# Patient Record
Sex: FEMALE | Race: BLACK OR AFRICAN AMERICAN | NOT HISPANIC OR LATINO | Employment: OTHER | URBAN - METROPOLITAN AREA
[De-identification: names, ages, dates, MRNs, and addresses within clinical notes are randomized per-mention and may not be internally consistent; named-entity substitution may affect disease eponyms.]

---

## 2017-02-03 ENCOUNTER — ALLSCRIPTS OFFICE VISIT (OUTPATIENT)
Dept: OTHER | Facility: OTHER | Age: 57
End: 2017-02-03

## 2018-01-13 VITALS
HEIGHT: 65 IN | HEART RATE: 58 BPM | WEIGHT: 262 LBS | SYSTOLIC BLOOD PRESSURE: 111 MMHG | DIASTOLIC BLOOD PRESSURE: 62 MMHG | BODY MASS INDEX: 43.65 KG/M2

## 2018-04-03 ENCOUNTER — HOSPITAL ENCOUNTER (EMERGENCY)
Facility: HOSPITAL | Age: 58
Discharge: HOME | End: 2018-04-04
Attending: EMERGENCY MEDICINE | Admitting: EMERGENCY MEDICINE
Payer: COMMERCIAL

## 2018-04-03 VITALS
RESPIRATION RATE: 18 BRPM | HEART RATE: 65 BPM | DIASTOLIC BLOOD PRESSURE: 85 MMHG | TEMPERATURE: 97.5 F | HEIGHT: 65 IN | WEIGHT: 270 LBS | BODY MASS INDEX: 44.98 KG/M2 | OXYGEN SATURATION: 100 % | SYSTOLIC BLOOD PRESSURE: 161 MMHG

## 2018-04-03 DIAGNOSIS — R25.2 MUSCLE CRAMP: ICD-10-CM

## 2018-04-03 DIAGNOSIS — T50.2X5A DIURETIC-INDUCED HYPOKALEMIA: ICD-10-CM

## 2018-04-03 DIAGNOSIS — E87.6 DIURETIC-INDUCED HYPOKALEMIA: ICD-10-CM

## 2018-04-03 DIAGNOSIS — E87.6 HYPOKALEMIA: Primary | ICD-10-CM

## 2018-04-04 LAB
ANION GAP SERPL CALCULATED.3IONS-SCNC: 12 MMOL/L (ref 4–13)
BUN SERPL-MCNC: 24 MG/DL (ref 5–25)
CALCIUM SERPL-MCNC: 9.5 MG/DL (ref 8.3–10.1)
CHLORIDE SERPL-SCNC: 100 MMOL/L (ref 100–108)
CK SERPL-CCNC: 58 U/L (ref 26–192)
CO2 SERPL-SCNC: 25 MMOL/L (ref 21–32)
CREAT SERPL-MCNC: 0.91 MG/DL (ref 0.6–1.3)
GFR SERPL CREATININE-BSD FRML MDRD: 82 ML/MIN/1.73SQ M
GLUCOSE SERPL-MCNC: 137 MG/DL (ref 65–140)
MAGNESIUM SERPL-MCNC: 2 MG/DL (ref 1.6–2.6)
POTASSIUM SERPL-SCNC: 3.2 MMOL/L (ref 3.5–5.3)
SODIUM SERPL-SCNC: 137 MMOL/L (ref 136–145)

## 2018-04-04 PROCEDURE — 99283 EMERGENCY DEPT VISIT LOW MDM: CPT

## 2018-04-04 PROCEDURE — 83735 ASSAY OF MAGNESIUM: CPT | Performed by: EMERGENCY MEDICINE

## 2018-04-04 PROCEDURE — 36415 COLL VENOUS BLD VENIPUNCTURE: CPT | Performed by: EMERGENCY MEDICINE

## 2018-04-04 PROCEDURE — 82550 ASSAY OF CK (CPK): CPT | Performed by: EMERGENCY MEDICINE

## 2018-04-04 PROCEDURE — 80048 BASIC METABOLIC PNL TOTAL CA: CPT | Performed by: EMERGENCY MEDICINE

## 2018-04-04 RX ORDER — POTASSIUM CHLORIDE 20 MEQ/1
20 TABLET, EXTENDED RELEASE ORAL 2 TIMES DAILY
Qty: 60 TABLET | Refills: 0 | Status: SHIPPED | OUTPATIENT
Start: 2018-04-04 | End: 2018-12-03 | Stop reason: SDDI

## 2018-04-04 RX ORDER — POTASSIUM CHLORIDE 20 MEQ/1
40 TABLET, EXTENDED RELEASE ORAL ONCE
Status: COMPLETED | OUTPATIENT
Start: 2018-04-04 | End: 2018-04-04

## 2018-04-04 RX ADMIN — POTASSIUM CHLORIDE 40 MEQ: 1500 TABLET, EXTENDED RELEASE ORAL at 01:27

## 2018-04-04 NOTE — ED PROVIDER NOTES
History  Chief Complaint   Patient presents with    Leg Pain     Patient reports recurrent leg pain over the past few weeks  Patient reports feeling like she has a stan horse and the inability to move her leg  Patient reports pain with any weight on it  Patient reports having abdominal pain and diarrhea as well     HPI    None       Past Medical History:   Diagnosis Date    Asthma     Hypertension        Past Surgical History:   Procedure Laterality Date     SECTION      HERNIA REPAIR         History reviewed  No pertinent family history  I have reviewed and agree with the history as documented  Social History   Substance Use Topics    Smoking status: Never Smoker    Smokeless tobacco: Never Used    Alcohol use Yes        Review of Systems    Physical Exam  ED Triage Vitals   Temperature Pulse Respirations Blood Pressure SpO2   18 2332 18   97 5 °F (36 4 °C) 65 18 161/85 100 %      Temp Source Heart Rate Source Patient Position - Orthostatic VS BP Location FiO2 (%)   18 23318 --   Oral Monitor Sitting Left arm       Pain Score       18       No Pain           Orthostatic Vital Signs  Vitals:    18   BP: 161/85   Pulse: 65   Patient Position - Orthostatic VS: Sitting       Physical Exam   Constitutional: She is oriented to person, place, and time  She appears well-developed and well-nourished  No distress  Morbid obesity   HENT:   Head: Normocephalic and atraumatic  Mouth/Throat: Oropharynx is clear and moist    Eyes: Conjunctivae are normal  Pupils are equal, round, and reactive to light  Neck: Normal range of motion  No tracheal deviation present  Cardiovascular: Normal rate, regular rhythm, normal heart sounds and intact distal pulses  Pulses:       Dorsalis pedis pulses are 2+ on the right side, and 2+ on the left side     Pulmonary/Chest: Effort normal and breath sounds normal  No respiratory distress  Abdominal: Soft  Bowel sounds are normal  She exhibits no distension  There is no tenderness  A hernia is present  Hernia confirmed positive in the ventral area (supraumbilical, soft, mild tenderness)  Musculoskeletal:        Right hip: She exhibits normal range of motion and normal strength  Right knee: She exhibits normal range of motion and no swelling  No tenderness found  Right ankle: She exhibits normal range of motion, no swelling, no ecchymosis and no deformity  Tenderness  Lateral malleolus and medial malleolus tenderness found  Lumbar back: She exhibits no tenderness  Right upper leg: She exhibits tenderness  She exhibits no bony tenderness, no swelling, no edema and no deformity  Right lower leg: She exhibits tenderness  She exhibits no bony tenderness, no swelling, no edema and no deformity  Left lower leg: She exhibits tenderness  Legs:  Neurological: She is alert and oriented to person, place, and time  She has normal strength  GCS eye subscore is 4  GCS verbal subscore is 5  GCS motor subscore is 6  Normal strength and sensation in bilateral legs   Skin: Skin is warm and dry  Psychiatric: She has a normal mood and affect  Her behavior is normal    Nursing note and vitals reviewed        ED Medications  Medications   potassium chloride (K-DUR,KLOR-CON) CR tablet 40 mEq (not administered)       Diagnostic Studies  Results Reviewed     Procedure Component Value Units Date/Time    Basic metabolic panel [[id number]]  (Abnormal) Collected:  04/04/18 0040    Lab Status:  Final result Specimen:  Blood from Hand, Right Updated:  04/04/18 0105     Sodium 137 mmol/L      Potassium 3 2 (L) mmol/L      Chloride 100 mmol/L      CO2 25 mmol/L      Anion Gap 12 mmol/L      BUN 24 mg/dL      Creatinine 0 91 mg/dL      Glucose 137 mg/dL      Calcium 9 5 mg/dL      eGFR 82 ml/min/1 73sq m     Narrative: National Kidney Disease Education Program recommendations are as follows:  GFR calculation is accurate only with a steady state creatinine  Chronic Kidney disease less than 60 ml/min/1 73 sq  meters  Kidney failure less than 15 ml/min/1 73 sq  meters  Magnesium [[id number]]  (Normal) Collected:  04/04/18 0040    Lab Status:  Final result Specimen:  Blood from Hand, Right Updated:  04/04/18 0105     Magnesium 2 0 mg/dL     CK Total with Reflex CKMB [[id number]]  (Normal) Collected:  04/04/18 0040    Lab Status:  Final result Specimen:  Blood from Hand, Right Updated:  04/04/18 0105     Total CK 58 U/L                  No orders to display              Procedures  Procedures       Phone Contacts  ED Phone Contact    ED Course  ED Course                                MDM  Number of Diagnoses or Management Options  Diuretic-induced hypokalemia: new and requires workup  Hypokalemia: new and requires workup  Muscle cramp: new and requires workup  Diagnosis management comments: This is a 65 year female who presents here today with right leg pain  She describes it as a severe cramping pain starting in her hip and radiating down her leg  It makes it feel like she cannot move her leg or put weight on it  She has associated burning and numbness type pain throughout her leg  She had an episode a couple weeks ago that lasted for about an hour and half  She states today she had an episode that lasted for about 3 hours prior to resolving  She has had brief, "charley horse" type pains in the leg throughout this time that usually only last for couple of seconds to a minute or so  She was seen a couple of weeks ago and diagnosed with a right ankle fracture from a fall six weeks prior to that  She is supposed to follow up with an orthopedist either tomorrow, or with the orthopedist that she has seen before on 4/7    She has been wearing a "soft splint" to her ankle, and was supposed to be wearing a walking boot, however because she has a history of Baker's cyst and problems with her knees for which she was told she needs a knee replacement, she feels that this is to heavy, so has not been wearing this when she has been walking around  She denies any recent falls or injuries  She occasionally has mild cramping pain in her left leg  She has no other cramping sensations, and says that even in her left leg the severity of the cramps are not as bad, and are not as associated with the burning numbness sensation, inability to move the leg  She denies any similar problems prior to the past couple of weeks  She has no leg swelling, recent travel  She was given Aleve today by somebody at work and did have gradual improvement of the pain over time  She denies any current pain  She does also mention some upper abdominal pain which she attributes to a known hernia that she was told that she needs surgery on  She states she has not yet had this surgery, and she has had surgery previously for this, and has been putting it off  She says over the past couple of months she feels like it is gradually becoming more painful and may be getting slightly larger, but denies any acute worsening or changing of the hernia  She has not followed up with her surgeon since she was told about this several months ago  She does endorse chronic mild diarrhea which has not recently changed from baseline  She denies any inability to move her bowels, nausea, vomiting, or any other complaints  ROS: Otherwise negative, unless stated as above  She is well-appearing, in no acute distress  She is morbidly obese  She does have mild tenderness to the medial and lateral thigh, as well as to her anterior shins bilaterally, and medial and lateral ankle  There are no areas of significant tenderness  She has no weakness, and is neurovascularly intact    Differential includes electrolyte abnormality from her HCTZ use, muscle pain and spasm secondary to abnormal gait from her recent ankle fracture, less likely rhabdomyolysis  The burning and numbness pain is likely due to peripheral nerve irritation from the muscle spasm  She is not having any pain in her back with these episodes, and states it starts near her hip in her thigh, consistent with peripheral nerve as opposed to central process  She does not have any symptoms to suggest DVT  She denies any known prior problems with her electrolytes, and says she recently had blood work checked by her doctor  She did have blood work checked by her PCP on 3/2 which showed normal potassium and creatinine, but magnesium was not checked at that time  She had an x-ray of her right ankle done at [facility] on 03/24 which showed "there is a horizontal line in the the right medial malleolus, which may represent undisplaced fracture "  We will check BMP and magnesium here to evaluate her electrolytes as well as CK  Do not feel that she needs any further workup for this at this time  She has no symptoms or findings on exam suggestive of acute incarcerated hernia for which she needs any emergent workup  She was advised to follow up with her surgeon for further evaluation of this, as well as findings suggestive of incarceration or strangulation for which she needs to be evaluated immediately in an emergency department  Her potassium is mildly low, which could be contributing to her symptoms  I discussed with the patient findings, treatment home for this, follow up with her primary care doctor, and indications for return, and she expresses understanding with this plan           Amount and/or Complexity of Data Reviewed  Clinical lab tests: ordered and reviewed  Decide to obtain previous medical records or to obtain history from someone other than the patient: yes  Review and summarize past medical records: yes      CritCare Time    Disposition  Final diagnoses:   Hypokalemia   Diuretic-induced hypokalemia   Muscle cramp     Time reflects when diagnosis was documented in both MDM as applicable and the Disposition within this note     Time User Action Codes Description Comment    4/4/2018  1:18 AM Senia Jo Add [E87 6] Hypokalemia     4/4/2018  1:19 AM Senia Jo Add [E87 6,  T50 2X5A] Diuretic-induced hypokalemia     4/4/2018  1:19 AM Senia Jo Add [R25 2] Muscle cramp       ED Disposition     ED Disposition Condition Comment    Discharge  Felix Benewah discharge to home/self care  Condition at discharge: Good        Follow-up Information     Follow up With Specialties Details Why [address], [address] Nurse Practitioner Go on 4/14/2018 as scheduled, for further evaluation of your symptoms, and repeat potassium [address] [address]  [phone]      your orthopedist  Go to as scheduled         Patient's Medications   Discharge Prescriptions    POTASSIUM CHLORIDE (K-DUR,KLOR-CON) 20 MEQ TABLET    Take 1 tablet (20 mEq total) by mouth 2 (two) times a day       Start Date: 4/4/2018  End Date: --       Order Dose: 20 mEq       Quantity: 60 tablet    Refills: 0     No discharge procedures on file      ED Provider  Electronically Signed by           Kimberly Briones MD  04/04/18 0199

## 2018-04-04 NOTE — DISCHARGE INSTRUCTIONS
Take the potassium as prescribed  Follow up with your doctor for recheck of your potassium level, as well as further evaluation of your symptoms  Follow up with the orthopedist as scheduled for further evaluation of your ankle injury  Return for worsening or changing symptoms, or for any other concerns  Hypokalemia   WHAT YOU NEED TO KNOW:   Hypokalemia is a low level of potassium in your blood  Potassium helps control how your muscles, heart, and digestive system work  Hypokalemia occurs when your body loses too much potassium or does not absorb enough from food  DISCHARGE INSTRUCTIONS:   Return to the emergency department if:   · You cannot move your arm or leg  · You have a fast or irregular heartbeat  · You are too tired or weak to stand up  Contact your healthcare provider if:   · You are vomiting, or you have diarrhea  · You have numbness or tingling in your arms or legs  · Your symptoms do not go away or they get worse  · You have questions or concerns about your condition or care  Medicines:   · Potassium  will be given to bring your potassium levels back to normal     · Take your medicine as directed  Contact your healthcare provider if you think your medicine is not helping or if you have side effects  Tell him of her if you are allergic to any medicine  Keep a list of the medicines, vitamins, and herbs you take  Include the amounts, and when and why you take them  Bring the list or the pill bottles to follow-up visits  Carry your medicine list with you in case of an emergency  Eat foods that are high in potassium:  Foods that are high in potassium include bananas, oranges, tomatoes, potatoes, and avocado  Swain beans, turkey, salmon, lean beef, yogurt, and milk are also high in potassium  Ask your healthcare provider or dietitian for more information about foods that are high in potassium     Follow up with your healthcare provider as directed:  Write down your questions so you remember to ask them during your visits  © 2017 [employer] Information is for End User's use only and may not be sold, redistributed or otherwise used for commercial purposes  All illustrations and images included in CareNotes® are the copyrighted property of A D [employer] M [employer]  or [employer]  The above information is an  only  It is not intended as medical advice for individual conditions or treatments  Talk to your doctor, nurse or pharmacist before following any medical regimen to see if it is safe and effective for you

## 2018-04-23 DIAGNOSIS — R56.9 SEIZURE (HCC): Primary | ICD-10-CM

## 2018-04-23 RX ORDER — OXCARBAZEPINE 300 MG/1
TABLET, FILM COATED ORAL
Qty: 180 TABLET | Refills: 3 | Status: SHIPPED | OUTPATIENT
Start: 2018-04-23 | End: 2018-05-31

## 2018-04-27 ENCOUNTER — TELEPHONE (OUTPATIENT)
Dept: NEUROLOGY | Facility: CLINIC | Age: 58
End: 2018-04-27

## 2018-04-27 NOTE — TELEPHONE ENCOUNTER
Pt called to report increased TN pain over past 2 wk sand "I'm at the point where I can't take it " Pt questioning if oxcarb 900mg BID can be increased  Pt has appt 5/31  Please advise       Pt [phone] or work [phone]

## 2018-04-27 NOTE — TELEPHONE ENCOUNTER
Called patient, advised to increase Trileptal to 1200 twice a day  She denies any side effects and in the past 900 milligrams had helped her  She will call us back in the next 2-3 days if she sees no relief

## 2018-04-28 ENCOUNTER — HOSPITAL ENCOUNTER (EMERGENCY)
Facility: HOSPITAL | Age: 58
Discharge: HOME | End: 2018-04-28
Attending: EMERGENCY MEDICINE | Admitting: EMERGENCY MEDICINE
Payer: COMMERCIAL

## 2018-04-28 VITALS
SYSTOLIC BLOOD PRESSURE: 195 MMHG | BODY MASS INDEX: 46.11 KG/M2 | RESPIRATION RATE: 18 BRPM | HEART RATE: 79 BPM | DIASTOLIC BLOOD PRESSURE: 87 MMHG | WEIGHT: 277.12 LBS | OXYGEN SATURATION: 96 % | TEMPERATURE: 98.3 F

## 2018-04-28 DIAGNOSIS — G50.0 TRIGEMINAL NEURALGIA OF RIGHT SIDE OF FACE: Primary | ICD-10-CM

## 2018-04-28 PROCEDURE — 99282 EMERGENCY DEPT VISIT SF MDM: CPT

## 2018-04-28 RX ORDER — HYDROCODONE BITARTRATE AND ACETAMINOPHEN 5; 325 MG/1; MG/1
1 TABLET ORAL ONCE
Status: COMPLETED | OUTPATIENT
Start: 2018-04-28 | End: 2018-04-28

## 2018-04-28 RX ORDER — HYDROCODONE BITARTRATE AND ACETAMINOPHEN 5; 325 MG/1; MG/1
1 TABLET ORAL EVERY 6 HOURS PRN
Qty: 13 TABLET | Refills: 0 | Status: SHIPPED | OUTPATIENT
Start: 2018-04-28 | End: 2018-12-03 | Stop reason: SDDI

## 2018-04-28 RX ORDER — LOSARTAN POTASSIUM AND HYDROCHLOROTHIAZIDE 12.5; 1 MG/1; MG/1
1 TABLET ORAL DAILY
COMMUNITY
Start: 2018-01-03

## 2018-04-28 RX ORDER — ALBUTEROL SULFATE 90 UG/1
1 AEROSOL, METERED RESPIRATORY (INHALATION) DAILY PRN
COMMUNITY
Start: 2015-01-26

## 2018-04-28 RX ADMIN — HYDROCODONE BITARTRATE AND ACETAMINOPHEN 1 TABLET: 5; 325 TABLET ORAL at 18:46

## 2018-04-28 NOTE — DISCHARGE INSTRUCTIONS
Trigeminal Neuralgia   WHAT YOU NEED TO KNOW:   Trigeminal neuralgia (TN) is a problem with your trigeminal nerve that causes severe facial pain  You have a trigeminal nerve on each side of your face  The nerves allow you to feel pain, touch, and temperature changes in different areas of your face  DISCHARGE INSTRUCTIONS:   Medicines:  Do not stop taking your medicines without talking with your healthcare provider first  Virgene Back can have a bad reaction if you stop your TN medicines suddenly  You may need any of the following:  · Anticonvulsants: These control seizures, help prevent pain attacks, and decrease symptoms  · Antidepressants: These decrease pain and help prevent depression  · Muscle relaxers:  When your facial muscles are relaxed, you may be less likely to have pain attacks  · Pain medicines: You may be given pain medicines if your facial pain is severe  · Take your medicine as directed  Contact your healthcare provider if you think your medicine is not helping or if you have side effects  Tell him or her if you are allergic to any medicine  Keep a list of the medicines, vitamins, and herbs you take  Include the amounts, and when and why you take them  Bring the list or the pill bottles to follow-up visits  Carry your medicine list with you in case of an emergency  Follow up with your healthcare provider as directed: You may need to have blood tests to check your blood levels of certain medicines  Ask how often you need to return to have these blood tests  Write down your questions so you remember to ask them during your visits  Manage your trigeminal neuralgia:  Even if you have not had symptoms for a long time, keep your medicines nearby  If your symptoms return, contact your healthcare provider before you start to take your medicines again  Contact your healthcare provider if:   · The medicines you are taking are not decreasing your pain      · You feel worried and depressed and find it hard to do your daily activities  · You have headaches, mouth sores, an upset stomach, or diarrhea  · Your pain feels worse, different, or moves to another area of your face  · You have questions or concerns about your condition or care  Return to the emergency department if:   · You have a fever, stiff neck, or you develop a skin rash or peeling skin  · You have clear or yellow fluid leaking from your procedure or surgery site  · You are feeling so depressed you want to harm yourself  · You are confused and cannot think clearly  · You are not eating or drinking, and you are losing weight  · You have eye pain, eye numbness, or sudden vision or hearing changes  · You have sudden dizziness, problems with movement, weakness, or numbness in your face  © 2017 [employer]  Information is for End User's use only and may not be sold, redistributed or otherwise used for commercial purposes  All illustrations and images included in CareNotes® are the copyrighted property of [employer]  or Gregory Garcia  The above information is an  only  It is not intended as medical advice for individual conditions or treatments  Talk to your doctor, nurse or pharmacist before following any medical regimen to see if it is safe and effective for you

## 2018-04-28 NOTE — ED PROVIDER NOTES
History  Chief Complaint   Patient presents with    Dental Pain     Patient c/o upper right dental pain  45-year-old female with past medical history significant for trigeminal neuralgia presents today with worsening of her pain  Pain is on the right side of her face, intermittent, and like a shock  Called her neurologist yesterday who increased her Trileptal to 400 mg twice a day  Has taken 2 doses of the increased dose with no change in her pain  Has not taken anything else for the pain  There is no rash  She is not having difficulty swallowing  No ear pain, no tinnitus  History provided by:  Patient      Prior to Admission Medications   Prescriptions Last Dose Informant Patient Reported? Taking? OXcarbazepine (TRILEPTAL) 300 mg tablet   No Yes   Sig: TAKE THREE TABLETS BY MOUTH TWO TIMES A DAY    albuterol (PROVENTIL HFA,VENTOLIN HFA) 90 mcg/act inhaler   Yes Yes   Si puff(s)   losartan-hydrochlorothiazide (HYZAAR) 100-12 5 MG per tablet   Yes Yes   Sig: Take 1 tablet by mouth   potassium chloride (K-DUR,KLOR-CON) 20 mEq tablet   No Yes   Sig: Take 1 tablet (20 mEq total) by mouth 2 (two) times a day      Facility-Administered Medications: None       Past Medical History:   Diagnosis Date    Asthma     Hypertension        Past Surgical History:   Procedure Laterality Date     SECTION      HERNIA REPAIR         History reviewed  No pertinent family history  I have reviewed and agree with the history as documented  Social History   Substance Use Topics    Smoking status: Never Smoker    Smokeless tobacco: Never Used    Alcohol use No        Review of Systems   Constitutional: Negative for diaphoresis, fatigue and fever  HENT: Negative for congestion, drooling, ear pain, facial swelling, mouth sores, sinus pain, tinnitus and trouble swallowing  Eyes: Negative for pain, discharge and visual disturbance  Respiratory: Negative for shortness of breath      Skin: Negative for pallor, rash and wound  Neurological: Negative for dizziness, facial asymmetry, speech difficulty and light-headedness  Psychiatric/Behavioral: Negative for confusion  Physical Exam  ED Triage Vitals   Temperature Pulse Respirations Blood Pressure SpO2   04/28/18 1805 04/28/18 1805 04/28/18 1805 04/28/18 1805 04/28/18 1805   98 3 °F (36 8 °C) 79 18 (!) 195/87 96 %      Temp Source Heart Rate Source Patient Position - Orthostatic VS BP Location FiO2 (%)   04/28/18 1805 04/28/18 1805 -- 04/28/18 1805 --   Oral Monitor  Right arm       Pain Score       04/28/18 1846       Worst Possible Pain           Orthostatic Vital Signs  Vitals:    04/28/18 1805   BP: (!) 195/87   Pulse: 79       Physical Exam   Constitutional: She is oriented to person, place, and time  She appears well-developed and well-nourished  HENT:   Head: Normocephalic and atraumatic  Right Ear: External ear normal    Left Ear: External ear normal    Mouth/Throat: Oropharynx is clear and moist    Eyes: Conjunctivae and EOM are normal  Pupils are equal, round, and reactive to light  Neck: Normal range of motion  Neck supple  No tracheal deviation present  Lymphadenopathy:     She has no cervical adenopathy  Neurological: She is alert and oriented to person, place, and time  Patient moving all extremities equally, no focal neuro deficits noted  Skin: Skin is warm and dry  No rash noted  Psychiatric: She has a normal mood and affect  Nursing note and vitals reviewed        ED Medications  Medications   HYDROcodone-acetaminophen (NORCO) 5-325 mg per tablet 1 tablet (1 tablet Oral Given 4/28/18 1846)       Diagnostic Studies  Results Reviewed     None                 No orders to display              Procedures  Procedures       Phone Contacts  ED Phone Contact    ED Course                               MDM  Number of Diagnoses or Management Options  Trigeminal neuralgia of right side of face: new and does not require workup  Risk of Complications, Morbidity, and/or Mortality  Presenting problems: low  Management options: low    Patient Progress  Patient progress: stable    CritCare Time    Disposition  Final diagnoses:   Trigeminal neuralgia of right side of face     Time reflects when diagnosis was documented in both MDM as applicable and the Disposition within this note     Time User Action Codes Description Comment    4/28/2018  6:43 PM Radha Lainez Add [G50 0] Trigeminal neuralgia of right side of face       ED Disposition     ED Disposition Condition Comment    Discharge  Everlene Roland discharge to home/self care  Condition at discharge: Stable        Follow-up Information     Follow up With Specialties Details Why Lacey Hicks MD Neurology Schedule an appointment as soon as possible for a visit  [place] [address]  [phone]          Patient's Medications   Discharge Prescriptions    HYDROCODONE-ACETAMINOPHEN (NORCO) 5-325 MG PER TABLET    Take 1 tablet by mouth every 6 (six) hours as needed for pain for up to 13 doses Max Daily Amount: 4 tablets       Start Date: 4/28/2018 End Date: --       Order Dose: 1 tablet       Quantity: 13 tablet    Refills: 0     No discharge procedures on file      ED Provider  Electronically Signed by           Carol Abebe DO  04/28/18 5570

## 2018-04-30 DIAGNOSIS — G50.0 TRIGEMINAL NEURALGIA: Primary | ICD-10-CM

## 2018-04-30 RX ORDER — GABAPENTIN 100 MG/1
CAPSULE ORAL
Qty: 100 CAPSULE | Refills: 5 | Status: SHIPPED | OUTPATIENT
Start: 2018-04-30 | End: 2018-12-03 | Stop reason: SDDI

## 2018-04-30 NOTE — TELEPHONE ENCOUNTER
Spoke with patient, will initiate gabapentin starting with 100 mg 3 times daily and titrating up to 300 mg 3 times daily if necessary    If still not effective she will notify me

## 2018-04-30 NOTE — TELEPHONE ENCOUNTER
Pt states that she went to the ER on Saturday for the pain and she was given hydrocodone acetaminophen 5-325 mg Q6H PRN  She states that this took away some of her pain, but not all of it, and she can not chew and has not been able to eat since Friday  Questioning to speak with you         [phone] or work at [phone]

## 2018-05-31 ENCOUNTER — OFFICE VISIT (OUTPATIENT)
Dept: NEUROLOGY | Facility: CLINIC | Age: 58
End: 2018-05-31
Payer: COMMERCIAL

## 2018-05-31 VITALS
HEIGHT: 65 IN | DIASTOLIC BLOOD PRESSURE: 76 MMHG | BODY MASS INDEX: 44.15 KG/M2 | WEIGHT: 265 LBS | HEART RATE: 66 BPM | SYSTOLIC BLOOD PRESSURE: 146 MMHG

## 2018-05-31 DIAGNOSIS — G50.0 TRIGEMINAL NEURALGIA: Primary | ICD-10-CM

## 2018-05-31 PROCEDURE — 99214 OFFICE O/P EST MOD 30 MIN: CPT | Performed by: PSYCHIATRY & NEUROLOGY

## 2018-05-31 RX ORDER — OXCARBAZEPINE 600 MG/1
TABLET, FILM COATED ORAL
Qty: 120 TABLET | Refills: 5 | Status: SHIPPED | OUTPATIENT
Start: 2018-05-31 | End: 2018-06-13

## 2018-05-31 RX ORDER — FERROUS SULFATE 325(65) MG
2 TABLET ORAL
COMMUNITY
End: 2018-12-03 | Stop reason: SDDI

## 2018-05-31 RX ORDER — DOCUSATE SODIUM 100 MG/1
1 CAPSULE, LIQUID FILLED ORAL DAILY
COMMUNITY
End: 2018-12-03 | Stop reason: SDDI

## 2018-05-31 RX ORDER — CLINDAMYCIN HYDROCHLORIDE 300 MG/1
CAPSULE ORAL
Refills: 0 | COMMUNITY
Start: 2018-05-03 | End: 2018-12-03 | Stop reason: SDDI

## 2018-05-31 RX ORDER — NAPROXEN 375 MG/1
TABLET, DELAYED RELEASE ORAL EVERY 12 HOURS
COMMUNITY
End: 2018-12-03 | Stop reason: SDDI

## 2018-05-31 NOTE — PROGRESS NOTES
Sheila Delvalle is a 65 y o  female  Chief Complaint   Patient presents with    Facial Pain     right side       Assessment:  1  Trigeminal neuralgia        Plan:  Increased Trileptal to 1200 mg twice daily  Follow-up 6 months    Discussion:  Mike Wagoner had increased breakthrough pain which has improved with 900 mg twice daily but is still having breakthrough pain before her next dose  I have recommended increasing to 1200 mg twice daily and if she continues to have problems would consider initiating gabapentin  Will plan to see her back in 6 months, sooner if she continues to have symptoms    Subjective:    HPI Meryle Johnson started to have breakthrough pain for trigeminal neuralgia about a month ago  She states that occurred around the time that she was started on potassium and was being treated for at a right facial infection  She states that she felt that the potassium in the antibiotics are interfering with her medication  When she completed the antibiotics she stopped the potassium but was still having some pain  Her dose of Trileptal was increased from 600 mg twice a day to 900 mg twice daily but was still having episodes of breakthrough pain  I had recommended that she initiate gabapentin but she did not want to add another medication and did feel that she was getting enough relief with her current dose of Trileptal   At present she would like to modify the dose  We discussed the potential to take 600 mg 3 times daily but she feels that she would not be able to remember to take the middle dose so we will increase her dose to 1200 mg twice daily    If she has problems with the medicine she will notify me    Vitals:    05/31/18 1330   BP: 146/76   BP Location: Left arm   Patient Position: Sitting   Cuff Size: Large   Pulse: 66   Weight: 120 kg (265 lb)   Height: 5' 4 5" (1 638 m)       Current Medications    Current Outpatient Prescriptions:     albuterol (PROVENTIL HFA,VENTOLIN HFA) 90 mcg/act inhaler, 2 puff(s), Disp: , Rfl:     gabapentin (NEURONTIN) 100 mg capsule, 1-3 pills 3 times daily as directed, Disp: 100 capsule, Rfl: 5    levonorgestrel (MIRENA, 52 MG,) 20 MCG/24HR IUD, by Intrauterine route, Disp: , Rfl:     losartan-hydrochlorothiazide (HYZAAR) 100-12 5 MG per tablet, Take 1 tablet by mouth, Disp: , Rfl:     Mometasone Furo-Formoterol Fum (DULERA) 200-5 MCG/ACT AERO, Inhale Daily, Disp: , Rfl:     OXcarbazepine (TRILEPTAL) 300 mg tablet, TAKE THREE TABLETS BY MOUTH TWO TIMES A DAY , Disp: 180 tablet, Rfl: 3    clindamycin (CLEOCIN) 300 MG capsule, TAKE 1 CAPSULE (300 MG) BY ORAL ROUTE EVERY 8 HOURS, Disp: , Rfl: 0    docusate sodium (COLACE) 100 mg capsule, Take 1 capsule by mouth daily, Disp: , Rfl:     ferrous sulfate 325 (65 Fe) mg tablet, Take 2 tablets by mouth, Disp: , Rfl:     HYDROcodone-acetaminophen (NORCO) 5-325 mg per tablet, Take 1 tablet by mouth every 6 (six) hours as needed for pain for up to 13 doses Max Daily Amount: 4 tablets, Disp: 13 tablet, Rfl: 0    naproxen (EC-NAPROSYN) 375 MG TBEC, Every 12 hours, Disp: , Rfl:     potassium chloride (K-DUR,KLOR-CON) 20 mEq tablet, Take 1 tablet (20 mEq total) by mouth 2 (two) times a day, Disp: 60 tablet, Rfl: 0      Allergies  Amoxicillin;  Iodine; and Shellfish-derived products    Past Medical History  Past Medical History:   Diagnosis Date    Anemia     Arthritis     Asthma     Baker's cyst     Chronic hip pain, left     CTS (carpal tunnel syndrome)     Facial pain     Hypertension     Knee pain     Shoulder injury     Trigeminal neuralgia          Past Surgical History:  Past Surgical History:   Procedure Laterality Date     SECTION      HERNIA REPAIR      TOOTH EXTRACTION      TUBAL LIGATION           Family History:  Family History   Problem Relation Age of Onset    Diabetes Mother     Lung cancer Mother     Diabetes Father     Throat cancer Father     Diabetes Sister     Heart disease Sister    Geetha Verma Alzheimer's disease Maternal Grandmother        Social History:   reports that she has never smoked  She has never used smokeless tobacco  She reports that she does not drink alcohol or use drugs  I have reviewed the past medical, social and family history, current medications, allergies, vitals, review of systems and updated this information as appropriate today     Objective:    Physical Exam    Neurological Exam  GENERAL:  Well-developed well-nourished woman in no acute distress  HEENT/NECK: Head is atraumatic normocephalic, neck is supple  CARDIOVASCULAR:  No Carotid bruit  NEUROLOGIC:  Mental Status: Awake and alert without aphasia  Cranial Nerves: Extraocular movements are full  Face is symmetrical to light touch in movements  Motor:  No drift is noted on arm extension  Coordination:  Finger-to-nose testing is performed accurately  Romberg is negative  Gait is stable                ROS:  Review of Systems   Constitutional: Negative  Negative for appetite change and fever  HENT: Negative  Negative for hearing loss, tinnitus, trouble swallowing and voice change  Eyes: Positive for pain  Negative for photophobia  Respiratory: Negative  Negative for shortness of breath  Cardiovascular: Negative  Negative for palpitations  Gastrointestinal: Negative  Negative for nausea and vomiting  Endocrine: Negative  Negative for cold intolerance and heat intolerance  Genitourinary: Negative  Negative for dysuria, frequency and urgency  Musculoskeletal: Positive for gait problem  Negative for myalgias and neck pain  Skin: Negative  Negative for rash  Neurological: Positive for headaches  Negative for dizziness, tremors, seizures, syncope, facial asymmetry, speech difficulty, weakness, light-headedness and numbness  Hematological: Negative  Does not bruise/bleed easily  Psychiatric/Behavioral: Positive for sleep disturbance  Negative for confusion and hallucinations

## 2018-06-01 ENCOUNTER — DOCUMENTATION (OUTPATIENT)
Dept: NEUROLOGY | Facility: CLINIC | Age: 58
End: 2018-06-01

## 2018-06-01 NOTE — PROGRESS NOTES
Pt called requesting a letter stating she had an office appt yesterday   Letter sent to H:[phone] per pt request

## 2018-06-13 ENCOUNTER — TELEPHONE (OUTPATIENT)
Dept: NEUROLOGY | Facility: CLINIC | Age: 58
End: 2018-06-13

## 2018-06-13 DIAGNOSIS — G50.0 TRIGEMINAL NEURALGIA: Primary | ICD-10-CM

## 2018-06-13 RX ORDER — OXCARBAZEPINE 300 MG/1
TABLET, FILM COATED ORAL
Qty: 210 TABLET | Refills: 5 | Status: SHIPPED | OUTPATIENT
Start: 2018-06-13 | End: 2018-12-03 | Stop reason: SDUPTHER

## 2018-06-13 NOTE — TELEPHONE ENCOUNTER
pt called and states that increased dose of oxcarbazepine was not working  so she decreased back to 900mg bid  she states that she couldn't eat or talk when taking  higher dose  she states that she will take 1 additional 300mg tab if pain and really bad, she would take this dose in between doses if needed    she wants to make sure this is ok to do     [phone]

## 2018-06-13 NOTE — TELEPHONE ENCOUNTER
Mera reports that the 600 mg tablet did not help her pain at all  She is back on the 300 mg pills using 900 twice daily and sometimes taking an additional 300 mg mid day  If she has issues in the future to consider using branded medicine    A new prescription for the 300 mg pills was sent to the pharmacy

## 2018-12-03 ENCOUNTER — OFFICE VISIT (OUTPATIENT)
Dept: NEUROLOGY | Facility: CLINIC | Age: 58
End: 2018-12-03
Payer: COMMERCIAL

## 2018-12-03 VITALS
HEIGHT: 65 IN | WEIGHT: 272 LBS | BODY MASS INDEX: 45.32 KG/M2 | SYSTOLIC BLOOD PRESSURE: 144 MMHG | HEART RATE: 70 BPM | DIASTOLIC BLOOD PRESSURE: 78 MMHG

## 2018-12-03 DIAGNOSIS — G50.0 TRIGEMINAL NEURALGIA: Primary | ICD-10-CM

## 2018-12-03 PROCEDURE — 99213 OFFICE O/P EST LOW 20 MIN: CPT | Performed by: PSYCHIATRY & NEUROLOGY

## 2018-12-03 RX ORDER — METHOCARBAMOL 500 MG/1
500 TABLET, FILM COATED ORAL EVERY 6 HOURS PRN
COMMUNITY
End: 2019-05-22 | Stop reason: ALTCHOICE

## 2018-12-03 RX ORDER — OXYCODONE HYDROCHLORIDE 5 MG/1
5 TABLET ORAL EVERY 4 HOURS PRN
COMMUNITY
End: 2019-03-13 | Stop reason: ALTCHOICE

## 2018-12-03 RX ORDER — OXCARBAZEPINE 300 MG/1
300 TABLET, FILM COATED ORAL EVERY 12 HOURS SCHEDULED
Qty: 210 TABLET | Refills: 5 | Status: SHIPPED | OUTPATIENT
Start: 2018-12-03 | End: 2019-04-09 | Stop reason: SDUPTHER

## 2018-12-03 NOTE — PROGRESS NOTES
Tyree Vega is a 63 y o  female returns in follow-up today with history of trigeminal neuralgia    Assessment:  1  Trigeminal neuralgia        Plan:  Continue current dose of Trileptal  Follow-up 6 months    Discussion:  Solitario List is doing well with current management  She will continue with her same dose of Trileptal in follow-up in 6 months      Subjective:    HUNTER Tesfaye reports that her facial pain symptoms have been under good control with current dose of Trileptal   She has no adverse effects from the medication  She recently had hernia surgery and her blood work at that time reportedly demonstrated no problems with her sodium level  She anticipates having the surgery in the near future      Past Medical History:   Diagnosis Date    Anemia     Arthritis     Asthma     Baker's cyst     Chronic hip pain, left     CTS (carpal tunnel syndrome)     Facial pain     Hypertension     Knee pain     Shoulder injury     Trigeminal neuralgia        Family History:  Family History   Problem Relation Age of Onset    Diabetes Mother     Lung cancer Mother     Diabetes Father     Throat cancer Father     Diabetes Sister     Heart disease Sister     Alzheimer's disease Maternal Grandmother        Past Surgical History:  Past Surgical History:   Procedure Laterality Date     SECTION      HERNIA REPAIR      TOOTH EXTRACTION      TUBAL LIGATION         Social History:   reports that she has quit smoking  She has never used smokeless tobacco  She reports that she does not drink alcohol or use drugs  Allergies:  Cats claw [uncaria tomentosa (cats claw)]; Eggs or egg-derived products; Iodine; Other;  Shellfish-derived products; and Amoxicillin      Current Outpatient Prescriptions:     albuterol (PROVENTIL HFA,VENTOLIN HFA) 90 mcg/act inhaler, 2 puff(s) prn, Disp: , Rfl:     levonorgestrel (MIRENA, 52 MG,) 20 MCG/24HR IUD, by Intrauterine route, Disp: , Rfl:    losartan-hydrochlorothiazide (HYZAAR) 100-12 5 MG per tablet, Take 1 tablet by mouth, Disp: , Rfl:     methocarbamol (ROBAXIN) 500 mg tablet, Take 500 mg by mouth every 6 (six) hours as needed for muscle spasms, Disp: , Rfl:     OXcarbazepine (TRILEPTAL) 300 mg tablet, Three p o  q a m  and 4 p o  q p m  (Patient taking differently: Take 300 mg by mouth Three p o  q a m  and 4 p o  q p m  ), Disp: 210 tablet, Rfl: 5    oxyCODONE (ROXICODONE) 5 mg immediate release tablet, Take 5 mg by mouth every 4 (four) hours as needed for moderate pain, Disp: , Rfl:     I have reviewed the past medical, social and family history, current medications, allergies, vitals, review of systems and updated this information as appropriate today     Objective:    Vitals:  Blood pressure 144/78, pulse 70, height 5' 4 5" (1 638 m), weight 123 kg (272 lb)  Physical Exam    Neurological Exam  GENERAL:  Well-developed well-nourished woman in no acute distress  HEENT/NECK: Head is atraumatic normocephalic, neck is supple  CARDIOVASCULAR:   No Carotid bruit  NEUROLOGIC:  Mental Status: Awake and alert without aphasia  Cranial Nerves: Extraocular movements are full  Face is symmetrical  Motor:   No drift is noted on arm extension  Coordination:   Gait reveals an antalgic gait pattern favoring the right leg utilizing a straight cane            ROS:    Review of Systems   Constitutional: Positive for fever  Negative for appetite change  HENT: Negative  Negative for hearing loss, tinnitus, trouble swallowing and voice change  Eyes: Negative  Negative for photophobia and pain  Respiratory: Negative  Negative for shortness of breath  Cardiovascular: Negative  Negative for palpitations  Gastrointestinal: Positive for nausea  Negative for vomiting  Endocrine: Negative  Negative for cold intolerance and heat intolerance  Genitourinary: Negative  Negative for dysuria, frequency and urgency     Musculoskeletal: Positive for gait problem (at times)  Negative for myalgias and neck pain  Skin: Negative  Negative for rash  Neurological: Negative for dizziness, tremors, seizures, syncope, facial asymmetry, speech difficulty, weakness, light-headedness, numbness and headaches  Hematological: Negative  Does not bruise/bleed easily  Psychiatric/Behavioral: Positive for sleep disturbance  Negative for confusion and hallucinations  All other systems reviewed and are negative

## 2019-02-21 ENCOUNTER — TELEPHONE (OUTPATIENT)
Dept: NEUROLOGY | Facility: CLINIC | Age: 59
End: 2019-02-21

## 2019-02-21 NOTE — TELEPHONE ENCOUNTER
Patient states not a workman's comp issue, she fell due to the numbness in her feet   Do you want me to put her in for a 40 min or an hour slot?

## 2019-02-21 NOTE — TELEPHONE ENCOUNTER
Called patient to clarify reason for earlier appointment  Patient states she had a fall at work and is now having sharp pains in her head as well as Numbness/Tingling in her feet  She is not sure if it has anything to do with her medications  Scheduling put her in a 20 min spot for March 13th  Please let me know if you need a longer visit

## 2019-03-13 ENCOUNTER — OFFICE VISIT (OUTPATIENT)
Dept: NEUROLOGY | Facility: CLINIC | Age: 59
End: 2019-03-13
Payer: COMMERCIAL

## 2019-03-13 VITALS
BODY MASS INDEX: 46.75 KG/M2 | HEART RATE: 76 BPM | SYSTOLIC BLOOD PRESSURE: 110 MMHG | DIASTOLIC BLOOD PRESSURE: 80 MMHG | WEIGHT: 280.6 LBS | HEIGHT: 65 IN

## 2019-03-13 DIAGNOSIS — G44.309 POST-TRAUMATIC HEADACHE, NOT INTRACTABLE, UNSPECIFIED CHRONICITY PATTERN: ICD-10-CM

## 2019-03-13 DIAGNOSIS — R20.2 PARESTHESIA: ICD-10-CM

## 2019-03-13 DIAGNOSIS — S06.0X0A CONCUSSION WITHOUT LOSS OF CONSCIOUSNESS, INITIAL ENCOUNTER: ICD-10-CM

## 2019-03-13 DIAGNOSIS — G50.0 TRIGEMINAL NEURALGIA: ICD-10-CM

## 2019-03-13 DIAGNOSIS — R41.3 MEMORY DIFFICULTY: Primary | ICD-10-CM

## 2019-03-13 PROCEDURE — 99215 OFFICE O/P EST HI 40 MIN: CPT | Performed by: PSYCHIATRY & NEUROLOGY

## 2019-03-13 RX ORDER — IBUPROFEN 800 MG/1
1 TABLET ORAL DAILY PRN
COMMUNITY
End: 2019-08-09 | Stop reason: ALTCHOICE

## 2019-03-13 RX ORDER — GABAPENTIN 100 MG/1
1 CAPSULE ORAL DAILY
COMMUNITY
End: 2019-05-22 | Stop reason: SDDI

## 2019-03-13 NOTE — PROGRESS NOTES
Alex Garcia is a 63 y o  female with history of trigeminal neuralgia who now presents with headaches, memory difficulty and paresthesia    Assessment:  1  Memory difficulty    2  Post-traumatic headache, not intractable, unspecified chronicity pattern    3  Paresthesia    4  Concussion without loss of consciousness, initial encounter    5  Trigeminal neuralgia        Plan:  Blood work  EMG right upper lower extremity  Speech therapy for cognitive therapy  Continue oxcarbazepine and gabapentin  Follow-up 2 months    Discussion:  Mera trigeminal neuralgia symptoms are under good control with present management  She unfortunately had a head injury with a subsequent concussion in November and since that time has had headaches and memory difficulty, which I suspect her secondary to her concussion  She has noted improvement in her headaches over time  Have recommended speech therapy for cognitive therapy and she will continue with ibuprofen as needed for her headache  She does have findings consistent with peripheral neuropathy and have recommended blood work and EMG to rule out secondary etiologies  I will see her back in follow-up on these are completed      Subjective:    HUNTER  Brice Apgar presents today with new problems  She states her trigeminal neuralgia symptoms are under good control with current management  She states that before she retired from work in November she was stepping into an elevator which was not even with the floor  She states she tripped she got in and fell forward hitting her head on the bar in on the floor  She did not lose consciousness but saw stars  She had headaches nausea and vomiting afterwards  She was taken to a hospital in [place] where she states she had a CT scan done and she was told this was fine  She states that since that time she has had headaches    She states the headaches localized prone to the vertex of her head which was where she hit her head and also in the frontal region  It is a pressure throbbing type pain associated with sonophobia  She states that right after the head injury she had frequent severe headaches associated with nausea and vomiting  She states gradually these have improved to a point where she gets a headache about once a week and finds that ibuprofen is effective in stopping it  She denies anything that precipitates the headache  She states if she does not take ibuprofen the headache can last for a day or 2  She denies any history of headaches of any significance prior to her head injury  She states in addition to the headaches she has been having problems with her memory  She states her  complaints she can't remember anything  He will tell her something and she does not remember  She states she had an appointment to come here but forgot about it until someone called to remind her  She denied any problems with her memory prior to her head injury and has not noticed any significant improvement over time  Independent of the symptoms she notes numbness in her toes  She denies any symptoms of neuropathic pain other than occasional stabbing type pain but this or occurs infrequently  She has not noticed any weakness in her legs  She notes that her balance isn't as good as it should be        Past Medical History:   Diagnosis Date    Anemia     Arthritis     Asthma     Baker's cyst     Chronic hip pain, left     CTS (carpal tunnel syndrome)     Facial pain     Hypertension     Knee pain     Shoulder injury     Trigeminal neuralgia        Family History:  Family History   Problem Relation Age of Onset    Diabetes Mother     Lung cancer Mother     Diabetes Father     Throat cancer Father     Diabetes Sister     Heart disease Sister     Alzheimer's disease Maternal Grandmother        Past Surgical History:  Past Surgical History:   Procedure Laterality Date     SECTION      HERNIA REPAIR      TOOTH EXTRACTION      TUBAL LIGATION         Social History:   reports that she has quit smoking  She has never used smokeless tobacco  She reports that she does not drink alcohol or use drugs  Allergies:  Cats claw [uncaria tomentosa (cats claw)]; Eggs or egg-derived products; Iodine; Other; Shellfish-derived products; and Amoxicillin      Current Outpatient Medications:     albuterol (PROVENTIL HFA,VENTOLIN HFA) 90 mcg/act inhaler, 2 puff(s) prn, Disp: , Rfl:     gabapentin (NEURONTIN) 100 mg capsule, Take 1 caplet by mouth daily Take 3 cap in morning 4 cap at night, Disp: , Rfl:     ibuprofen (MOTRIN) 800 mg tablet, Take 1 tablet by mouth daily as needed, Disp: , Rfl:     levonorgestrel (MIRENA, 52 MG,) 20 MCG/24HR IUD, by Intrauterine route, Disp: , Rfl:     losartan-hydrochlorothiazide (HYZAAR) 100-12 5 MG per tablet, Take 1 tablet by mouth daily , Disp: , Rfl:     methocarbamol (ROBAXIN) 500 mg tablet, Take 500 mg by mouth every 6 (six) hours as needed for muscle spasms, Disp: , Rfl:     OXcarbazepine (TRILEPTAL) 300 mg tablet, Take 1 tablet (300 mg total) by mouth every 12 (twelve) hours Three p o  q a m  and 4 p o  q p m , Disp: 210 tablet, Rfl: 5    I have reviewed the past medical, social and family history, current medications, allergies, vitals, review of systems and updated this information as appropriate today     Objective:    Vitals:  Blood pressure 110/80, pulse 76, height 5' 4 5" (1 638 m), weight 127 kg (280 lb 9 6 oz)  Physical Exam    Neurological Exam    GENERAL:  Cooperative in no acute distress  Well-developed and well-nourished    HEAD and NECK   Head is atraumatic normocephalic with no lesions or masses  Neck is supple with full range of motion    CARDIOVASCULAR  Carotid Arteries-no carotid bruits  NEUROLOGIC:  Mental Status-the patient is awake alert and oriented without aphasia or apraxia  She scored 29/30 on Wagoner, failure to recall 1 of 3 objects after a few minutes    She was able to score 4/4 on clock drawing  Cranial Nerves: Visual fields are full to confrontation  Discs are flat  Extraocular movements are full without nystagmus  Pupils are 2-1/2 mm and reactive  Face is symmetrical to light touch  Movements of facial expression move symmetrically  Hearing is normal to finger rub bilaterally  Soft palate lifts symmetrically  Shoulder shrug is symmetrical  Tongue is midline without atrophy  Motor: No drift is noted on arm extension  Strength is full in the upper and lower extremities with normal bulk and tone  Sensory:  Diminished temperature and vibratory sensation in the distal lower extremities bilaterally  Cortical function is intact  Coordination: Finger to nose testing is performed accurately  Romberg is negative  Gait reveals a normal base with symmetrical arm swing  Tandem walk is normal   Reflexes:  2/4 in the biceps, triceps, brachioradialis, knee jerk and ankle jerk regions  Toes are downgoing            ROS:    Review of Systems   Constitutional: Positive for fatigue  Eyes: Negative  Respiratory: Negative  Cardiovascular: Negative  Gastrointestinal: Positive for nausea  Endocrine: Negative  Musculoskeletal: Positive for arthralgias (b/l legs), back pain, gait problem and neck pain  Skin: Negative  Allergic/Immunologic: Negative  Neurological: Positive for weakness (right hand), light-headedness, numbness (feet) and headaches  Hematological: Negative  Psychiatric/Behavioral: Positive for confusion, decreased concentration (short term memory issues) and sleep disturbance (difficulty staying asleep)

## 2019-03-20 ENCOUNTER — TELEPHONE (OUTPATIENT)
Dept: NEUROLOGY | Facility: CLINIC | Age: 59
End: 2019-03-20

## 2019-03-20 NOTE — TELEPHONE ENCOUNTER
Discussed with patient regarding the abnormal blood work including increased sedimentation rate, advised her to follow up with Anitha Smiley, spoke to Anitha Smiley regarding the blood work, she knows the patient well, patient has history of osteoarthritis and she is going to rule out any other etiology of increased sed rate temporal arteritis would be in the differential diagnosis with her headache, Anitha Smiley is going to follow up with the patient and do the necessary management

## 2019-04-09 ENCOUNTER — PROCEDURE VISIT (OUTPATIENT)
Dept: NEUROLOGY | Facility: CLINIC | Age: 59
End: 2019-04-09
Payer: COMMERCIAL

## 2019-04-09 DIAGNOSIS — R20.2 PARESTHESIA: ICD-10-CM

## 2019-04-09 DIAGNOSIS — G44.309 POST-TRAUMATIC HEADACHE, NOT INTRACTABLE, UNSPECIFIED CHRONICITY PATTERN: Primary | ICD-10-CM

## 2019-04-09 DIAGNOSIS — G50.0 TRIGEMINAL NEURALGIA: ICD-10-CM

## 2019-04-09 PROCEDURE — 95911 NRV CNDJ TEST 9-10 STUDIES: CPT | Performed by: PSYCHIATRY & NEUROLOGY

## 2019-04-09 PROCEDURE — 95886 MUSC TEST DONE W/N TEST COMP: CPT | Performed by: PSYCHIATRY & NEUROLOGY

## 2019-04-09 RX ORDER — OXCARBAZEPINE 300 MG/1
300 TABLET, FILM COATED ORAL EVERY 12 HOURS SCHEDULED
Qty: 210 TABLET | Refills: 5 | Status: SHIPPED | OUTPATIENT
Start: 2019-04-09 | End: 2020-02-03 | Stop reason: SDUPTHER

## 2019-04-24 ENCOUNTER — TELEPHONE (OUTPATIENT)
Dept: NEUROLOGY | Facility: CLINIC | Age: 59
End: 2019-04-24

## 2019-05-01 ENCOUNTER — TELEPHONE (OUTPATIENT)
Dept: NEUROLOGY | Facility: CLINIC | Age: 59
End: 2019-05-01

## 2019-05-01 DIAGNOSIS — M31.6 TEMPORAL ARTERITIS (HCC): Primary | ICD-10-CM

## 2019-05-01 RX ORDER — PREDNISONE 10 MG/1
TABLET ORAL
Qty: 180 TABLET | Refills: 3 | Status: SHIPPED | OUTPATIENT
Start: 2019-05-01 | End: 2019-05-17 | Stop reason: SDUPTHER

## 2019-05-06 ENCOUNTER — TELEPHONE (OUTPATIENT)
Dept: NEUROLOGY | Facility: CLINIC | Age: 59
End: 2019-05-06

## 2019-05-17 ENCOUNTER — TELEPHONE (OUTPATIENT)
Dept: NEUROLOGY | Facility: CLINIC | Age: 59
End: 2019-05-17

## 2019-05-17 DIAGNOSIS — M31.6 TEMPORAL ARTERITIS (HCC): ICD-10-CM

## 2019-05-17 RX ORDER — PREDNISONE 10 MG/1
TABLET ORAL
Qty: 180 TABLET | Refills: 3 | Status: SHIPPED | OUTPATIENT
Start: 2019-05-17 | End: 2019-12-03 | Stop reason: ALTCHOICE

## 2019-05-20 ENCOUNTER — TELEPHONE (OUTPATIENT)
Dept: NEUROLOGY | Facility: CLINIC | Age: 59
End: 2019-05-20

## 2019-05-22 ENCOUNTER — TELEPHONE (OUTPATIENT)
Dept: NEUROLOGY | Facility: CLINIC | Age: 59
End: 2019-05-22

## 2019-05-22 ENCOUNTER — OFFICE VISIT (OUTPATIENT)
Dept: NEUROLOGY | Facility: CLINIC | Age: 59
End: 2019-05-22
Payer: COMMERCIAL

## 2019-05-22 VITALS
HEART RATE: 68 BPM | DIASTOLIC BLOOD PRESSURE: 80 MMHG | WEIGHT: 287.6 LBS | SYSTOLIC BLOOD PRESSURE: 156 MMHG | HEIGHT: 65 IN | BODY MASS INDEX: 47.92 KG/M2

## 2019-05-22 DIAGNOSIS — M31.6 TEMPORAL ARTERITIS (HCC): Primary | ICD-10-CM

## 2019-05-22 DIAGNOSIS — S06.0X0D CONCUSSION WITHOUT LOSS OF CONSCIOUSNESS, SUBSEQUENT ENCOUNTER: ICD-10-CM

## 2019-05-22 DIAGNOSIS — G50.0 TRIGEMINAL NEURALGIA: ICD-10-CM

## 2019-05-22 DIAGNOSIS — R41.3 MEMORY DIFFICULTY: ICD-10-CM

## 2019-05-22 PROCEDURE — 99214 OFFICE O/P EST MOD 30 MIN: CPT | Performed by: PSYCHIATRY & NEUROLOGY

## 2019-07-09 ENCOUNTER — TELEPHONE (OUTPATIENT)
Dept: NEUROLOGY | Facility: CLINIC | Age: 59
End: 2019-07-09

## 2019-07-09 NOTE — TELEPHONE ENCOUNTER
Patient states she believes she is having an allergic reaction to the prednisone  She states she is currently taking 40mg total every day and that the plan was to wean off of this in August     She reports her body is "blowing up," her face is swollen, and she is having increasing shortness of breath/trouble breathing with exertion and talking  Patient states she was getting winded while talking on the phone  She also complains of tightness in throat and heaviness in chest with exertion  Reports her ankles and feet are also swollen  Denies itching or hives, however, does reports rash in her genital area that she states she always gets when having an allergic reaction to medication  She describes this rash as "prickly bumps" that she expects will start to burn as it has in previous reactions  Patient reports she started having trouble with breathing about 2 weeks ago and the swellint started before that  She reports her face started swelling after the temporal artery duplex on 5/24  States she saw her pulmonologist for asthma who told her she needs to get off of the prednisone  Patient advised to be evaluated in the ED if she is having increasing trouble breathing  States "I'll go if it gets worse " She would like to taper the prednisone  Patient would like to speak with Dr Royce Rodriguez  Unable to reach staff at [place] location       [phone]

## 2019-07-09 NOTE — TELEPHONE ENCOUNTER
I spoke with Chichi Salazar, she was to be having sedimentation rates done monthly and has not had 1 done since April  She states she just had 1 a couple of days ago at [facility] but have not received these results yet  She is currently decreased her dose of prednisone down to 40 mg  She denies any significant headaches  She is having a lot of side effects  Have recommended decreasing her dose to 30 mg daily and will await the results of her recent sedimentation rate  She was instructed to have another sedimentation rate done next month    She continues to have problems she will notify me or go to the emergency room

## 2019-08-09 ENCOUNTER — APPOINTMENT (OUTPATIENT)
Dept: LAB | Facility: CLINIC | Age: 59
End: 2019-08-09
Payer: COMMERCIAL

## 2019-08-09 ENCOUNTER — OFFICE VISIT (OUTPATIENT)
Dept: NEUROLOGY | Facility: CLINIC | Age: 59
End: 2019-08-09
Payer: COMMERCIAL

## 2019-08-09 VITALS
HEART RATE: 82 BPM | WEIGHT: 293 LBS | BODY MASS INDEX: 48.82 KG/M2 | HEIGHT: 65 IN | DIASTOLIC BLOOD PRESSURE: 72 MMHG | SYSTOLIC BLOOD PRESSURE: 150 MMHG

## 2019-08-09 DIAGNOSIS — G50.0 TRIGEMINAL NEURALGIA: ICD-10-CM

## 2019-08-09 DIAGNOSIS — M31.6 TEMPORAL ARTERITIS (HCC): Primary | ICD-10-CM

## 2019-08-09 DIAGNOSIS — R41.3 MEMORY DIFFICULTY: ICD-10-CM

## 2019-08-09 LAB — ERYTHROCYTE [SEDIMENTATION RATE] IN BLOOD: 36 MM/HOUR (ref 0–20)

## 2019-08-09 PROCEDURE — 36415 COLL VENOUS BLD VENIPUNCTURE: CPT | Performed by: PSYCHIATRY & NEUROLOGY

## 2019-08-09 PROCEDURE — 99214 OFFICE O/P EST MOD 30 MIN: CPT | Performed by: PSYCHIATRY & NEUROLOGY

## 2019-08-09 PROCEDURE — 85652 RBC SED RATE AUTOMATED: CPT | Performed by: PSYCHIATRY & NEUROLOGY

## 2019-08-09 RX ORDER — EPINEPHRINE 0.3 MG/.3ML
INJECTION SUBCUTANEOUS ONCE
Refills: 0 | COMMUNITY
Start: 2019-06-24

## 2019-08-09 RX ORDER — IBUPROFEN 400 MG/1
400 TABLET ORAL DAILY PRN
COMMUNITY
End: 2020-06-03 | Stop reason: ALTCHOICE

## 2019-08-09 NOTE — PROGRESS NOTES
Panda Mckeon is a 63 y o  female who returns in follow-up today with history of trigeminal neuralgia, temporal arteritis and memory difficulty    Assessment:  1  Temporal arteritis ([address] )    2  Trigeminal neuralgia    3  Memory difficulty        Plan:  Continue Trileptal  Continue current dose of prednisone  Sedimentation rate monthly  Follow-up 3 months    Discussion:  Nette trigeminal neuralgia symptoms remain under good control with Trileptal   She denies any headaches related to her trigeminal neuralgia  She has not had a sedimentation rate done since May  She thought she was having blood work done monthly but it was not the sedimentation rate and it was not sent to me  Explained the rationale for having a monthly sedimentation rate again  She should not modify her prednisone without my direction  She plans to have it done through the [facility] system from now on and I will contact her once that is complete to determine further modification of her medication  Her last sedimentation rate was 32 back in late May  I will see her back in follow-up in 3 months      Subjective:    HUNTER  Afshan Hancock denies any symptoms of trigeminal neuralgia on oxcarbazepine  She has not been having any headaches  She states that she has gained weight since she has been on the prednisone  She independently decreased her dose down to 20 mg daily and has not had a sedimentation rate done since May  She states she thought she was having sedimentation rates done monthly but was having blood work done for other people  Her sedimentation rate from May was never sent to me (done at [employer])  She did have her temporal artery ultrasound done which was negative for findings consistent with temporal arteritis  She has been going to cognitive therapy and feels she is doing a bit better        Past Medical History:   Diagnosis Date    Anemia     Arthritis     Asthma     Baker's cyst     Chronic hip pain, left  CTS (carpal tunnel syndrome)     Facial pain     Hypertension     Knee pain     Shoulder injury     Trigeminal neuralgia        Family History:  Family History   Problem Relation Age of Onset    Diabetes Mother     Lung cancer Mother     Diabetes Father     Throat cancer Father     Diabetes Sister     Heart disease Sister     Alzheimer's disease Maternal Grandmother        Past Surgical History:  Past Surgical History:   Procedure Laterality Date     SECTION      HERNIA REPAIR      TOOTH EXTRACTION      TUBAL LIGATION         Social History:   reports that she has quit smoking  She has never used smokeless tobacco  She reports that she does not drink alcohol or use drugs  Allergies:  Eggs or egg-derived products; Iodine; Other; Peanut-containing drug products;  Shellfish-derived products; and Amoxicillin      Current Outpatient Medications:     albuterol (PROVENTIL HFA,VENTOLIN HFA) 90 mcg/act inhaler, daily as needed , Disp: , Rfl:     EPINEPHrine (EPIPEN) 0 3 mg/0 3 mL SOAJ, daily as needed, Disp: , Rfl: 0    ibuprofen (MOTRIN) 400 mg tablet, Take 400 mg by mouth daily as needed for mild pain, Disp: , Rfl:     levonorgestrel (MIRENA, 52 MG,) 20 MCG/24HR IUD, by Intrauterine route, Disp: , Rfl:     losartan-hydrochlorothiazide (HYZAAR) 100-12 5 MG per tablet, Take 1 tablet by mouth daily , Disp: , Rfl:     OXcarbazepine (TRILEPTAL) 300 mg tablet, Take 1 tablet (300 mg total) by mouth every 12 (twelve) hours Three p o  q a m  and 4 p o  q p m  (Patient taking differently: Take 900 mg by mouth every 12 (twelve) hours Three p o  q a m  and 4 p o  q p m ), Disp: 210 tablet, Rfl: 5    predniSONE 10 mg tablet, Six pills daily x2 weeks then decreased to 5 pills daily (Patient taking differently: Take 20 mg by mouth every morning Six pills daily x2 weeks then decreased to 5 pills daily), Disp: 180 tablet, Rfl: 3    I have reviewed the past medical, social and family history, current medications, allergies, vitals, review of systems and updated this information as appropriate today     Objective:    Vitals:  Blood pressure 150/72, pulse 82, height 5' 4 5" (1 638 m), weight 135 kg (298 lb)  Physical Exam    Neurological Exam  GENERAL:  Well-developed well-nourished woman in no acute distress  HEENT/NECK: Head is atraumatic normocephalic, neck is supple  No tenderness is noted with palpation in the temporal regions  NEUROLOGIC:  Mental Status: Awake and alert without aphasia  Cranial Nerves: Extraocular movements are full  Face is symmetrical  Motor:  No drift is noted on arm extension  Coordination:  Finger-to-nose testing is performed accurately  Gait is stable            ROS:    Review of Systems   Constitutional: Positive for fatigue  Negative for appetite change, diaphoresis and fever  HENT: Positive for tinnitus  Negative for hearing loss, trouble swallowing and voice change  Eyes: Positive for photophobia and visual disturbance  Negative for pain  Respiratory: Positive for shortness of breath  Cardiovascular: Positive for chest pain and leg swelling  Negative for palpitations  Gastrointestinal: Positive for nausea  Negative for abdominal pain, constipation, diarrhea and vomiting  Endocrine: Negative  Negative for cold intolerance and heat intolerance  Genitourinary: Positive for urgency  Negative for dysuria and frequency  Musculoskeletal: Positive for back pain and gait problem  Negative for myalgias and neck pain  Skin: Negative  Negative for rash  Neurological: Positive for numbness (left arm) and headaches  Negative for dizziness, tremors, seizures, syncope, facial asymmetry, speech difficulty, weakness and light-headedness  Hematological: Negative  Does not bruise/bleed easily  Psychiatric/Behavioral: Positive for confusion and sleep disturbance  Negative for hallucinations

## 2019-08-13 ENCOUNTER — TELEPHONE (OUTPATIENT)
Dept: NEUROLOGY | Facility: CLINIC | Age: 59
End: 2019-08-13

## 2019-08-13 NOTE — TELEPHONE ENCOUNTER
I spoke with [place] regarding results were sedimentation rate  It is stable at 36  She reports that she continues to have a left-sided headache that is not as severe as the headache that she was having initially  She will increase her prednisone dose to 30 mg from 20 mg daily and continue this and repeat a sedimentation rate in a month

## 2019-09-05 ENCOUNTER — HOSPITAL ENCOUNTER (EMERGENCY)
Facility: HOSPITAL | Age: 59
Discharge: HOME | End: 2019-09-05
Attending: EMERGENCY MEDICINE
Payer: COMMERCIAL

## 2019-09-05 ENCOUNTER — APPOINTMENT (EMERGENCY)
Dept: CT IMAGING | Facility: HOSPITAL | Age: 59
End: 2019-09-05
Payer: COMMERCIAL

## 2019-09-05 VITALS
BODY MASS INDEX: 50.71 KG/M2 | OXYGEN SATURATION: 95 % | DIASTOLIC BLOOD PRESSURE: 67 MMHG | SYSTOLIC BLOOD PRESSURE: 139 MMHG | RESPIRATION RATE: 20 BRPM | TEMPERATURE: 98.2 F | WEIGHT: 293 LBS | HEART RATE: 76 BPM

## 2019-09-05 DIAGNOSIS — K92.2 LOWER GI BLEED: ICD-10-CM

## 2019-09-05 DIAGNOSIS — K64.9 HEMORRHOIDS: ICD-10-CM

## 2019-09-05 DIAGNOSIS — J45.909 ASTHMA: ICD-10-CM

## 2019-09-05 DIAGNOSIS — R10.9 ABDOMINAL PAIN: ICD-10-CM

## 2019-09-05 DIAGNOSIS — K62.5 BRBPR (BRIGHT RED BLOOD PER RECTUM): Primary | ICD-10-CM

## 2019-09-05 LAB
ALBUMIN SERPL BCP-MCNC: 3.1 G/DL (ref 3.5–5)
ALP SERPL-CCNC: 108 U/L (ref 46–116)
ALT SERPL W P-5'-P-CCNC: 22 U/L (ref 12–78)
ANION GAP SERPL CALCULATED.3IONS-SCNC: 11 MMOL/L (ref 4–13)
APTT PPP: 21 SECONDS (ref 23–37)
AST SERPL W P-5'-P-CCNC: 29 U/L (ref 5–45)
ATRIAL RATE: 72 BPM
BACTERIA UR QL AUTO: ABNORMAL /HPF
BASOPHILS # BLD MANUAL: 0 THOUSAND/UL (ref 0–0.1)
BASOPHILS NFR MAR MANUAL: 0 % (ref 0–1)
BILIRUB SERPL-MCNC: 0.4 MG/DL (ref 0.2–1)
BILIRUB UR QL STRIP: NEGATIVE
BUN SERPL-MCNC: 19 MG/DL (ref 5–25)
CALCIUM SERPL-MCNC: 8.9 MG/DL (ref 8.3–10.1)
CHLORIDE SERPL-SCNC: 96 MMOL/L (ref 100–108)
CLARITY UR: CLEAR
CO2 SERPL-SCNC: 27 MMOL/L (ref 21–32)
COLOR UR: YELLOW
CREAT SERPL-MCNC: 0.8 MG/DL (ref 0.6–1.3)
EOSINOPHIL # BLD MANUAL: 0 THOUSAND/UL (ref 0–0.4)
EOSINOPHIL NFR BLD MANUAL: 0 % (ref 0–6)
ERYTHROCYTE [DISTWIDTH] IN BLOOD BY AUTOMATED COUNT: 15.2 % (ref 11.6–15.1)
GFR SERPL CREATININE-BSD FRML MDRD: 95 ML/MIN/1.73SQ M
GLUCOSE SERPL-MCNC: 185 MG/DL (ref 65–140)
GLUCOSE SERPL-MCNC: 306 MG/DL (ref 65–140)
GLUCOSE UR STRIP-MCNC: ABNORMAL MG/DL
HCT VFR BLD AUTO: 39.3 % (ref 34.8–46.1)
HGB BLD-MCNC: 12.4 G/DL (ref 11.5–15.4)
HGB UR QL STRIP.AUTO: ABNORMAL
INR PPP: 1.02 (ref 0.84–1.19)
KETONES UR STRIP-MCNC: ABNORMAL MG/DL
LEUKOCYTE ESTERASE UR QL STRIP: ABNORMAL
LIPASE SERPL-CCNC: 80 U/L (ref 73–393)
LYMPHOCYTES # BLD AUTO: 2.48 THOUSAND/UL (ref 0.6–4.47)
LYMPHOCYTES # BLD AUTO: 22 % (ref 14–44)
MAGNESIUM SERPL-MCNC: 1.7 MG/DL (ref 1.6–2.6)
MCH RBC QN AUTO: 27.9 PG (ref 26.8–34.3)
MCHC RBC AUTO-ENTMCNC: 31.6 G/DL (ref 31.4–37.4)
MCV RBC AUTO: 88 FL (ref 82–98)
MONOCYTES # BLD AUTO: 0.45 THOUSAND/UL (ref 0–1.22)
MONOCYTES NFR BLD: 4 % (ref 4–12)
MUCOUS THREADS UR QL AUTO: ABNORMAL
NEUTROPHILS # BLD MANUAL: 8.34 THOUSAND/UL (ref 1.85–7.62)
NEUTS SEG NFR BLD AUTO: 74 % (ref 43–75)
NITRITE UR QL STRIP: NEGATIVE
NON-SQ EPI CELLS URNS QL MICRO: ABNORMAL /HPF
NRBC BLD AUTO-RTO: 0 /100 WBCS
NT-PROBNP SERPL-MCNC: 39 PG/ML
OTHER STN SPEC: ABNORMAL
P AXIS: 71 DEGREES
PH UR STRIP.AUTO: 5 [PH]
PLATELET # BLD AUTO: 217 THOUSANDS/UL (ref 149–390)
PLATELET BLD QL SMEAR: ADEQUATE
PMV BLD AUTO: 10.4 FL (ref 8.9–12.7)
POTASSIUM SERPL-SCNC: 4 MMOL/L (ref 3.5–5.3)
PR INTERVAL: 118 MS
PROT SERPL-MCNC: 7.3 G/DL (ref 6.4–8.2)
PROT UR STRIP-MCNC: NEGATIVE MG/DL
PROTHROMBIN TIME: 13.4 SECONDS (ref 11.6–14.5)
QRS AXIS: 32 DEGREES
QRSD INTERVAL: 90 MS
QT INTERVAL: 392 MS
QTC INTERVAL: 429 MS
RBC # BLD AUTO: 4.45 MILLION/UL (ref 3.81–5.12)
RBC #/AREA URNS AUTO: ABNORMAL /HPF
SODIUM SERPL-SCNC: 134 MMOL/L (ref 136–145)
SP GR UR STRIP.AUTO: >=1.03 (ref 1–1.03)
T WAVE AXIS: 26 DEGREES
TOTAL CELLS COUNTED SPEC: 100
TROPONIN I SERPL-MCNC: <0.02 NG/ML
UROBILINOGEN UR QL STRIP.AUTO: 0.2 E.U./DL
VENTRICULAR RATE: 72 BPM
WBC # BLD AUTO: 11.27 THOUSAND/UL (ref 4.31–10.16)
WBC #/AREA URNS AUTO: ABNORMAL /HPF

## 2019-09-05 PROCEDURE — 83735 ASSAY OF MAGNESIUM: CPT | Performed by: EMERGENCY MEDICINE

## 2019-09-05 PROCEDURE — 84484 ASSAY OF TROPONIN QUANT: CPT | Performed by: EMERGENCY MEDICINE

## 2019-09-05 PROCEDURE — 80053 COMPREHEN METABOLIC PANEL: CPT | Performed by: EMERGENCY MEDICINE

## 2019-09-05 PROCEDURE — 83880 ASSAY OF NATRIURETIC PEPTIDE: CPT | Performed by: EMERGENCY MEDICINE

## 2019-09-05 PROCEDURE — 82948 REAGENT STRIP/BLOOD GLUCOSE: CPT

## 2019-09-05 PROCEDURE — 94640 AIRWAY INHALATION TREATMENT: CPT

## 2019-09-05 PROCEDURE — 85027 COMPLETE CBC AUTOMATED: CPT | Performed by: EMERGENCY MEDICINE

## 2019-09-05 PROCEDURE — 85610 PROTHROMBIN TIME: CPT | Performed by: EMERGENCY MEDICINE

## 2019-09-05 PROCEDURE — 99285 EMERGENCY DEPT VISIT HI MDM: CPT

## 2019-09-05 PROCEDURE — 96375 TX/PRO/DX INJ NEW DRUG ADDON: CPT

## 2019-09-05 PROCEDURE — 99284 EMERGENCY DEPT VISIT MOD MDM: CPT | Performed by: EMERGENCY MEDICINE

## 2019-09-05 PROCEDURE — 81001 URINALYSIS AUTO W/SCOPE: CPT | Performed by: EMERGENCY MEDICINE

## 2019-09-05 PROCEDURE — 83690 ASSAY OF LIPASE: CPT | Performed by: EMERGENCY MEDICINE

## 2019-09-05 PROCEDURE — 85007 BL SMEAR W/DIFF WBC COUNT: CPT | Performed by: EMERGENCY MEDICINE

## 2019-09-05 PROCEDURE — 85730 THROMBOPLASTIN TIME PARTIAL: CPT | Performed by: EMERGENCY MEDICINE

## 2019-09-05 PROCEDURE — 96374 THER/PROPH/DIAG INJ IV PUSH: CPT

## 2019-09-05 PROCEDURE — 93005 ELECTROCARDIOGRAM TRACING: CPT

## 2019-09-05 PROCEDURE — 36415 COLL VENOUS BLD VENIPUNCTURE: CPT | Performed by: EMERGENCY MEDICINE

## 2019-09-05 PROCEDURE — 93010 ELECTROCARDIOGRAM REPORT: CPT | Performed by: INTERNAL MEDICINE

## 2019-09-05 PROCEDURE — 74176 CT ABD & PELVIS W/O CONTRAST: CPT

## 2019-09-05 RX ORDER — ONDANSETRON 4 MG/1
4 TABLET, ORALLY DISINTEGRATING ORAL EVERY 6 HOURS PRN
Qty: 20 TABLET | Refills: 0 | Status: SHIPPED | OUTPATIENT
Start: 2019-09-05 | End: 2019-12-03 | Stop reason: ALTCHOICE

## 2019-09-05 RX ORDER — ONDANSETRON 2 MG/ML
4 INJECTION INTRAMUSCULAR; INTRAVENOUS ONCE
Status: COMPLETED | OUTPATIENT
Start: 2019-09-05 | End: 2019-09-05

## 2019-09-05 RX ORDER — ALBUTEROL SULFATE 2.5 MG/3ML
5 SOLUTION RESPIRATORY (INHALATION) ONCE
Status: COMPLETED | OUTPATIENT
Start: 2019-09-05 | End: 2019-09-05

## 2019-09-05 RX ORDER — DICYCLOMINE HCL 20 MG
20 TABLET ORAL ONCE
Status: COMPLETED | OUTPATIENT
Start: 2019-09-05 | End: 2019-09-05

## 2019-09-05 RX ORDER — DICYCLOMINE HCL 20 MG
20 TABLET ORAL 2 TIMES DAILY
Qty: 20 TABLET | Refills: 0 | Status: SHIPPED | OUTPATIENT
Start: 2019-09-05 | End: 2019-12-03 | Stop reason: ALTCHOICE

## 2019-09-05 RX ORDER — DIAPER,BRIEF,INFANT-TODD,DISP
EACH MISCELLANEOUS
Qty: 15 G | Refills: 0 | Status: SHIPPED | OUTPATIENT
Start: 2019-09-05

## 2019-09-05 RX ORDER — ONDANSETRON 4 MG/1
4 TABLET, ORALLY DISINTEGRATING ORAL EVERY 6 HOURS PRN
Qty: 20 TABLET | Refills: 0 | Status: SHIPPED | OUTPATIENT
Start: 2019-09-05 | End: 2019-09-05 | Stop reason: SDUPTHER

## 2019-09-05 RX ADMIN — INSULIN HUMAN 10 UNITS: 100 INJECTION, SOLUTION PARENTERAL at 19:48

## 2019-09-05 RX ADMIN — ONDANSETRON 4 MG: 2 INJECTION INTRAMUSCULAR; INTRAVENOUS at 19:46

## 2019-09-05 RX ADMIN — DICYCLOMINE HYDROCHLORIDE 20 MG: 20 TABLET ORAL at 19:45

## 2019-09-05 RX ADMIN — IPRATROPIUM BROMIDE 0.5 MG: 0.5 SOLUTION RESPIRATORY (INHALATION) at 20:56

## 2019-09-05 RX ADMIN — ALBUTEROL SULFATE 5 MG: 2.5 SOLUTION RESPIRATORY (INHALATION) at 20:56

## 2019-09-05 NOTE — ED PROVIDER NOTES
History  Chief Complaint   Patient presents with    Rectal Bleeding     pt with one episode of rectal bleeding this afternoon after passing stool  pt c/o nausea after  currently c/o nausea, headache and rectal pain  63 y o  F presents w rectal / GI bleeding  Patient states that at 1300 today, 3 hours prior to arrival, patient had an episode of GI bleeding versus rectal bleeding  She states that she had a bowel movement that was painful, then she noted blood in the toilet mixed w soft stool  She states this is associated with nausea, abdominal pain, headache, and rectal pain  PMHx of hernia surgery in November  Also on chronic steroids to treat headaches  Prior to Admission Medications   Prescriptions Last Dose Informant Patient Reported? Taking? EPINEPHrine (EPIPEN) 0 3 mg/0 3 mL SOAJ   Yes No   Sig: daily as needed   OXcarbazepine (TRILEPTAL) 300 mg tablet  Self No No   Sig: Take 1 tablet (300 mg total) by mouth every 12 (twelve) hours Three p o  q a m  and 4 p o  q p m     Patient taking differently: Take 900 mg by mouth every 12 (twelve) hours Three p o  q a m  and 4 p o  q p m    albuterol (PROVENTIL HFA,VENTOLIN HFA) 90 mcg/act inhaler  Self Yes No   Sig: daily as needed    ibuprofen (MOTRIN) 400 mg tablet  Self Yes No   Sig: Take 400 mg by mouth daily as needed for mild pain   levonorgestrel (MIRENA, 52 MG,) 20 MCG/24HR IUD   Yes No   Sig: by Intrauterine route   losartan-hydrochlorothiazide (HYZAAR) 100-12 5 MG per tablet  Self Yes No   Sig: Take 1 tablet by mouth daily    predniSONE 10 mg tablet   No No   Sig: Six pills daily x2 weeks then decreased to 5 pills daily   Patient taking differently: Take 20 mg by mouth every morning Six pills daily x2 weeks then decreased to 5 pills daily      Facility-Administered Medications: None       Past Medical History:   Diagnosis Date    Anemia     Arthritis     Asthma     Baker's cyst     Chronic hip pain, left     CTS (carpal tunnel syndrome)     Facial pain     Hypertension     Knee pain     Shoulder injury     Trigeminal neuralgia        Past Surgical History:   Procedure Laterality Date     SECTION      HERNIA REPAIR      TOOTH EXTRACTION      TUBAL LIGATION         Family History   Problem Relation Age of Onset    Diabetes Mother     Lung cancer Mother     Diabetes Father     Throat cancer Father     Diabetes Sister     Heart disease Sister     Alzheimer's disease Maternal Grandmother      I have reviewed and agree with the history as documented  Social History     Tobacco Use    Smoking status: Former Smoker    Smokeless tobacco: Never Used    Tobacco comment: quit 15 yrs ago   Substance Use Topics    Alcohol use: Yes     Comment: social    Drug use: No        Review of Systems   Constitutional: Positive for fatigue  Negative for chills and fever  HENT: Negative for congestion and sore throat  Respiratory: Negative for cough, chest tightness and shortness of breath  Cardiovascular: Positive for leg swelling  Negative for chest pain and palpitations  Gastrointestinal: Positive for abdominal pain, anal bleeding, blood in stool, nausea and rectal pain  Negative for constipation, diarrhea (loose stool) and vomiting  Genitourinary: Negative for dysuria and flank pain  Musculoskeletal: Negative for back pain and neck pain  Skin: Negative for color change and rash  Allergic/Immunologic: Negative for immunocompromised state  Neurological: Positive for dizziness and light-headedness  Negative for syncope, weakness, numbness and headaches  Physical Exam  Physical Exam   Constitutional: She is oriented to person, place, and time  She appears well-developed and well-nourished  No distress  HENT:   Head: Normocephalic and atraumatic  Mouth/Throat: Oropharynx is clear and moist    Eyes: Pupils are equal, round, and reactive to light   Conjunctivae and EOM are normal  Right eye exhibits no discharge  Left eye exhibits no discharge  No scleral icterus  Neck: Normal range of motion  Neck supple  No JVD present  Cardiovascular: Normal rate, regular rhythm, normal heart sounds and intact distal pulses  Exam reveals no gallop and no friction rub  No murmur heard  Pulmonary/Chest: Effort normal and breath sounds normal  No respiratory distress  She has no wheezes  She has no rales  She exhibits no tenderness  Abdominal: Soft  Bowel sounds are normal  She exhibits no distension  There is tenderness  There is no rebound and no guarding  Musculoskeletal: Normal range of motion  She exhibits edema (b/l)  She exhibits no tenderness or deformity  Neurological: She is alert and oriented to person, place, and time  No cranial nerve deficit or sensory deficit  Skin: Skin is warm and dry  No rash noted  She is not diaphoretic  No erythema  No pallor  Psychiatric: She has a normal mood and affect  Her behavior is normal    Vitals reviewed        Vital Signs  ED Triage Vitals [09/05/19 1556]   Temperature Pulse Respirations Blood Pressure SpO2   98 2 °F (36 8 °C) 77 20 150/72 97 %      Temp Source Heart Rate Source Patient Position - Orthostatic VS BP Location FiO2 (%)   Oral Monitor Sitting Left arm --      Pain Score       8           Vitals:    09/05/19 1900 09/05/19 2000 09/05/19 2100 09/05/19 2130   BP: 141/76 153/71 161/87 139/67   Pulse: 77 69 68 76   Patient Position - Orthostatic VS:             Visual Acuity      ED Medications  Medications   insulin regular (HumuLIN R,NovoLIN R) injection 10 Units (10 Units Intravenous Given 9/5/19 1948)   dicyclomine (BENTYL) tablet 20 mg (20 mg Oral Given 9/5/19 1945)   ondansetron (ZOFRAN) injection 4 mg (4 mg Intravenous Given 9/5/19 1946)   albuterol inhalation solution 5 mg (5 mg Nebulization Given 9/5/19 2056)   ipratropium (ATROVENT) 0 02 % inhalation solution 0 5 mg (0 5 mg Nebulization Given 9/5/19 2056)       Diagnostic Studies  Results Reviewed Procedure Component Value Units Date/Time    Fingerstick Glucose (POCT) [[id number]]  (Abnormal) Collected:  09/05/19 2138    Lab Status:  Final result Updated:  09/05/19 2140     POC Glucose 185 mg/dl     Urine Microscopic [[id number]]  (Abnormal) Collected:  09/05/19 1842    Lab Status:  Final result Specimen:  Urine, Clean Catch Updated:  09/05/19 1857     RBC, UA 0-1 /hpf      WBC, UA 2-4 /hpf      Epithelial Cells Occasional /hpf      Bacteria, UA Occasional /hpf      OTHER OBSERVATIONS Yeast Cells Present     MUCUS THREADS Occasional    UA w Reflex to Microscopic w Reflex to Culture [[id number]]  (Abnormal) Collected:  09/05/19 1842    Lab Status:  Final result Specimen:  Urine, Clean Catch Updated:  09/05/19 1850     Color, UA Yellow     Clarity, UA Clear     Specific Gravity, UA >=1 030     pH, UA 5 0     Leukocytes, UA Elevated glucose may cause decreased leukocyte values  See urine microscopic for [facility] result/     Nitrite, UA Negative     Protein, UA Negative mg/dl      Glucose, UA >=1000 (1%) mg/dl      Ketones, UA Trace mg/dl      Urobilinogen, UA 0 2 E U /dl      Bilirubin, UA Negative     Blood, UA Trace-Intact    CBC and differential [[id number]]  (Abnormal) Collected:  09/05/19 1728    Lab Status:  Final result Specimen:  Blood from Arm, Right Updated:  09/05/19 1827     WBC 11 27 Thousand/uL      RBC 4 45 Million/uL      Hemoglobin 12 4 g/dL      Hematocrit 39 3 %      MCV 88 fL      MCH 27 9 pg      MCHC 31 6 g/dL      RDW 15 2 %      MPV 10 4 fL      Platelets 668 Thousands/uL      nRBC 0 /100 WBCs     Narrative: This is an appended report  These results have been appended to a previously verified report      CMP [[id number]]  (Abnormal) Collected:  09/05/19 1714    Lab Status:  Final result Specimen:  Blood from Arm, Left Updated:  09/05/19 1749     Sodium 134 mmol/L      Potassium 4 0 mmol/L      Chloride 96 mmol/L      CO2 27 mmol/L      ANION GAP 11 mmol/L      BUN 19 mg/dL      Creatinine 0 80 mg/dL      Glucose 306 mg/dL      Calcium 8 9 mg/dL      AST 29 U/L      ALT 22 U/L      Alkaline Phosphatase 108 U/L      Total Protein 7 3 g/dL      Albumin 3 1 g/dL      Total Bilirubin 0 40 mg/dL      eGFR 95 ml/min/1 73sq m     Narrative:       [facility] [place] by [facility] guidelines for Chronic Kidney Disease (CKD):     Stage 1 with normal or high GFR (GFR > 90 mL/min/1 73 square meters)    Stage 2 Mild CKD (GFR = 60-89 mL/min/1 73 square meters)    Stage 3A Moderate CKD (GFR = 45-59 mL/min/1 73 square meters)    Stage 3B Moderate CKD (GFR = 30-44 mL/min/1 73 square meters)    Stage 4 Severe CKD (GFR = 15-29 mL/min/1 73 square meters)    Stage 5 End Stage CKD (GFR <15 mL/min/1 73 square meters)  Note: GFR calculation is accurate only with a steady state creatinine    Lipase [[id number]]  (Normal) Collected:  09/05/19 1714    Lab Status:  Final result Specimen:  Blood from Arm, Left Updated:  09/05/19 1749     Lipase 80 u/L     Magnesium [[id number]]  (Normal) Collected:  09/05/19 1714    Lab Status:  Final result Specimen:  Blood from Arm, Left Updated:  09/05/19 1749     Magnesium 1 7 mg/dL     NT-BNP PRO (BNP for [place], [place], [place], [place], [place], [place], , [place] campuses) [[id number]]  (Normal) Collected:  09/05/19 1714    Lab Status:  Final result Specimen:  Blood from Arm, Left Updated:  09/05/19 1749     NT-proBNP 39 pg/mL     Troponin I [[id number]]  (Normal) Collected:  09/05/19 1716    Lab Status:  Final result Specimen:  Blood from Arm, Left Updated:  09/05/19 1747     Troponin I <0 02 ng/mL     Protime-INR [[id number]]  (Normal) Collected:  09/05/19 1714    Lab Status:  Final result Specimen:  Blood from Arm, Left Updated:  09/05/19 1739     Protime 13 4 seconds      INR 1 02    APTT [[id number]]  (Abnormal) Collected:  09/05/19 1714    Lab Status:  Final result Specimen:  Blood from Arm, Left Updated:  09/05/19 1739     PTT 21 seconds                  CT abdomen pelvis wo contrast   ED Interpretation by Jacquelyn Evans DO (09/05 2008)   Limited exam performed without oral or IV contrast        Probable liver cysts  Liver morphology suggests possible minimal cirrhotic change  Small splenic artery aneurysms identified  Colonic diverticulosis without CT evidence of diverticulitis or colitis        IUD in the uterus  3 8 cm cystic structure in the right adnexa, likely an ovarian cyst   This could be evaluated by ultrasound if clinically warranted           Final Result by Rosalina Washburn DO (09/05 1939)   Limited exam performed without oral or IV contrast       Probable liver cysts  Liver morphology suggests possible minimal cirrhotic change  Small splenic artery aneurysms identified  Colonic diverticulosis without CT evidence of diverticulitis or colitis  IUD in the uterus  3 8 cm cystic structure in the right adnexa, likely an ovarian cyst   This could be evaluated by ultrasound if clinically warranted  Workstation performed: [id number]                    Procedures  ECG 12 Lead Documentation Only  Date/Time: 9/5/2019 5:29 PM  Performed by: Jacquelyn Evans DO  Authorized by: Jacquelyn Evans DO     Indications / Diagnosis:  Dizziness, leg swelling  ECG reviewed by me, the ED Provider: yes    Patient location:  ED  Previous ECG:     Previous ECG:  Unavailable    Comparison to cardiac monitor: Yes    Interpretation:     Interpretation: normal    Rate:     ECG rate:  72    ECG rate assessment: normal    Rhythm:     Rhythm: sinus rhythm    Ectopy:     Ectopy: none    QRS:     QRS axis:  Normal    QRS intervals:  Normal  Conduction:     Conduction: abnormal      Abnormal conduction: incomplete RBBB    ST segments:     ST segments:  Normal  T waves:     T waves: normal             ED Course  ED Course as of Sep 06 0121   Thu Sep 05, 2019   1930 Leukocytes, UA(!): Elevated glucose may cause decreased leukocyte values   See urine microscopic for [facility] result/   2008 Likely w difficulty controlling blood glucose because of chronic steroids for headaches  2143 POC Glucose(!): 185                               MDM  Number of Diagnoses or Management Options  Abdominal pain:   Asthma:   BRBPR (bright red blood per rectum):   Hemorrhoids:   Lower GI bleed:   Diagnosis management comments: Rectal bleeding vs GI bleeding  Abdominal pain  Will do abdominal labwork, CT abd/pelvis, also eval for CHF given worsening of b/l LE edema  While here  Patient develops an asthma exacerbation which is quickly resolved with breathing treatment  Workup normal here  Hemoccult positive, also noted hemorrhoids  Patient is stable for outpatient workup, given information for GI for a colonoscopy to evaluate cause right red blood per rectum  Patient is treated for hemorrhoids  Advised good return precautions in case worsening condition, loss of large quantity of blood, signs of symptomatic anemia, etc   Discussed with patient and they understood the risks and benefits of discharge  Patient had opportunity to ask questions regarding care and discharge instructions and had no further questions  Advised follow up with PCP, advised returning if worsening, and discussed disease specific return precautions  Patient understood discharge instructions            Amount and/or Complexity of Data Reviewed  Clinical lab tests: ordered and reviewed  Tests in the radiology section of CPT®: ordered and reviewed        Disposition  Final diagnoses:   BRBPR (bright red blood per rectum)   Lower GI bleed   Hemorrhoids   Asthma   Abdominal pain     Time reflects when diagnosis was documented in both MDM as applicable and the Disposition within this note     Time User Action Codes Description Comment    9/5/2019  5:39 PM Coppersmith, Colletta Chi Add [K62 5] BRBPR (bright red blood per rectum)     9/5/2019  9:43 PM Coppersmith, Colletta Chi Add [K92 2] Lower GI bleed     9/5/2019  9:43 PM Coppersmith, Colletta Chi Add [K64 9] Hemorrhoids     9/5/2019  9:43 PM Adam Marcus Add [Q83 628] Asthma     9/5/2019  9:46 PM Brandie Watts Add [R10 9] Abdominal pain       ED Disposition     ED Disposition Condition Date/Time Comment    Discharge Stable u Sep 5, 2019  9:43 PM Everlene Mission discharge to home/self care  Follow-up Information     Follow up With Specialties Details Why Contact Info Additional Information    CHAD Peterson Nurse Practitioner, Family Medicine Schedule an appointment as soon as possible for a visit   [address] [facility] [phone]       [facility] Emergency Department Emergency Medicine  If symptoms worsen [address] [facility] [address] [phone] [place] ED, [address], [place], [address] [facility], [place] General Surgery Schedule an appointment as soon as possible for a visit  For follow up to ensure improvement, and for further testing and treatment as needed [address] [phone]             Discharge Medication List as of 9/5/2019  9:47 PM      START taking these medications    Details   dicyclomine (BENTYL) 20 mg tablet Take 1 tablet (20 mg total) by mouth 2 (two) times a day As needed for abdominal pain/cramping, Starting Thu 9/5/2019, Print      hydrocortisone 1 % cream Apply to rectal area daily    Preparation H , Print      ondansetron (ZOFRAN-ODT) 4 mg disintegrating tablet Take 1 tablet (4 mg total) by mouth every 6 (six) hours as needed for nausea or vomiting, Starting Thu 9/5/2019, Normal         CONTINUE these medications which have NOT CHANGED    Details   albuterol (PROVENTIL HFA,VENTOLIN HFA) 90 mcg/act inhaler daily as needed , Starting Mon 1/26/2015, Historical Med      EPINEPHrine (EPIPEN) 0 3 mg/0 3 mL SOAJ daily as needed, Starting Mon 6/24/2019, Historical Med      ibuprofen (MOTRIN) 400 mg tablet Take 400 mg by mouth daily as needed for mild pain, Historical Med      levonorgestrel (MIRENA, 52 MG,) 20 MCG/24HR IUD by Intrauterine route, Historical Med      losartan-hydrochlorothiazide (HYZAAR) 100-12 5 MG per tablet Take 1 tablet by mouth daily , Starting Wed 1/3/2018, Historical Med      OXcarbazepine (TRILEPTAL) 300 mg tablet Take 1 tablet (300 mg total) by mouth every 12 (twelve) hours Three p o  q a m  and 4 p o  q p m , Starting Tue 4/9/2019, Normal      predniSONE 10 mg tablet Six pills daily x2 weeks then decreased to 5 pills daily, Normal           No discharge procedures on file      ED Provider  Electronically Signed by           Angélica Naranjo DO  09/06/19 0050

## 2019-09-05 NOTE — ED NOTES
Patient transported to [address] [place] [address], [address] [place] [address]  09/05/19 4866

## 2019-09-09 ENCOUNTER — APPOINTMENT (OUTPATIENT)
Dept: LAB | Facility: CLINIC | Age: 59
End: 2019-09-09
Payer: COMMERCIAL

## 2019-09-09 LAB
CRP SERPL QL: 37.5 MG/L
ERYTHROCYTE [SEDIMENTATION RATE] IN BLOOD: 34 MM/HOUR (ref 0–20)

## 2019-09-09 PROCEDURE — 85652 RBC SED RATE AUTOMATED: CPT | Performed by: PSYCHIATRY & NEUROLOGY

## 2019-09-09 PROCEDURE — 86140 C-REACTIVE PROTEIN: CPT | Performed by: PSYCHIATRY & NEUROLOGY

## 2019-09-09 PROCEDURE — 36415 COLL VENOUS BLD VENIPUNCTURE: CPT | Performed by: PSYCHIATRY & NEUROLOGY

## 2019-09-12 ENCOUNTER — TELEPHONE (OUTPATIENT)
Dept: NEUROLOGY | Facility: CLINIC | Age: 59
End: 2019-09-12

## 2019-09-12 NOTE — TELEPHONE ENCOUNTER
Spoke with patient regarding blood work  Her sedimentation rate is 34  She will continue with 20 mg of prednisone daily (no headaches) and if sed rate remains stable next month consider slow taper

## 2019-10-08 ENCOUNTER — APPOINTMENT (OUTPATIENT)
Dept: LAB | Facility: CLINIC | Age: 59
End: 2019-10-08
Payer: COMMERCIAL

## 2019-10-09 ENCOUNTER — TELEPHONE (OUTPATIENT)
Dept: NEUROLOGY | Facility: CLINIC | Age: 59
End: 2019-10-09

## 2019-10-09 NOTE — TELEPHONE ENCOUNTER
Message left on patient's voicemail to return phone call when available    Anticipate decreasing prednisone by 5 mg to 15 mg daily if having no headaches

## 2019-10-09 NOTE — TELEPHONE ENCOUNTER
Spoke with patient who reports no headaches    She is currently on 20 mg of prednisone and have recommended decreasing the dose to 15 mg daily until her next blood test next month

## 2019-10-20 ENCOUNTER — APPOINTMENT (EMERGENCY)
Dept: CT IMAGING | Facility: HOSPITAL | Age: 59
End: 2019-10-20
Payer: COMMERCIAL

## 2019-10-20 ENCOUNTER — HOSPITAL ENCOUNTER (EMERGENCY)
Facility: HOSPITAL | Age: 59
Discharge: HOME | End: 2019-10-21
Attending: EMERGENCY MEDICINE | Admitting: EMERGENCY MEDICINE
Payer: COMMERCIAL

## 2019-10-20 DIAGNOSIS — K62.5 BRBPR (BRIGHT RED BLOOD PER RECTUM): Primary | ICD-10-CM

## 2019-10-20 DIAGNOSIS — R10.10 UPPER ABDOMINAL PAIN: ICD-10-CM

## 2019-10-20 DIAGNOSIS — K62.89 RECTAL PAIN: ICD-10-CM

## 2019-10-20 DIAGNOSIS — R51.9 HEADACHE: ICD-10-CM

## 2019-10-20 DIAGNOSIS — K60.2 ANAL FISSURE: ICD-10-CM

## 2019-10-20 LAB
ALBUMIN SERPL BCP-MCNC: 3.1 G/DL (ref 3.5–5)
ALP SERPL-CCNC: 105 U/L (ref 46–116)
ALT SERPL W P-5'-P-CCNC: 21 U/L (ref 12–78)
ANION GAP SERPL CALCULATED.3IONS-SCNC: 11 MMOL/L (ref 4–13)
APTT PPP: 30 SECONDS (ref 23–37)
AST SERPL W P-5'-P-CCNC: 13 U/L (ref 5–45)
BASOPHILS # BLD AUTO: 0.05 THOUSANDS/ΜL (ref 0–0.1)
BASOPHILS NFR BLD AUTO: 0 % (ref 0–1)
BILIRUB DIRECT SERPL-MCNC: 0.06 MG/DL (ref 0–0.2)
BILIRUB SERPL-MCNC: 0.2 MG/DL (ref 0.2–1)
BUN SERPL-MCNC: 18 MG/DL (ref 5–25)
CALCIUM SERPL-MCNC: 8.9 MG/DL (ref 8.3–10.1)
CHLORIDE SERPL-SCNC: 100 MMOL/L (ref 100–108)
CO2 SERPL-SCNC: 28 MMOL/L (ref 21–32)
CREAT SERPL-MCNC: 0.86 MG/DL (ref 0.6–1.3)
EOSINOPHIL # BLD AUTO: 0.01 THOUSAND/ΜL (ref 0–0.61)
EOSINOPHIL NFR BLD AUTO: 0 % (ref 0–6)
ERYTHROCYTE [DISTWIDTH] IN BLOOD BY AUTOMATED COUNT: 14.4 % (ref 11.6–15.1)
GFR SERPL CREATININE-BSD FRML MDRD: 86 ML/MIN/1.73SQ M
GLUCOSE SERPL-MCNC: 182 MG/DL (ref 65–140)
HCT VFR BLD AUTO: 37.2 % (ref 34.8–46.1)
HGB BLD-MCNC: 11.7 G/DL (ref 11.5–15.4)
IMM GRANULOCYTES # BLD AUTO: 0.25 THOUSAND/UL (ref 0–0.2)
IMM GRANULOCYTES NFR BLD AUTO: 2 % (ref 0–2)
INR PPP: 1.01 (ref 0.84–1.19)
LIPASE SERPL-CCNC: 76 U/L (ref 73–393)
LYMPHOCYTES # BLD AUTO: 1.33 THOUSANDS/ΜL (ref 0.6–4.47)
LYMPHOCYTES NFR BLD AUTO: 11 % (ref 14–44)
MCH RBC QN AUTO: 27.8 PG (ref 26.8–34.3)
MCHC RBC AUTO-ENTMCNC: 31.5 G/DL (ref 31.4–37.4)
MCV RBC AUTO: 88 FL (ref 82–98)
MONOCYTES # BLD AUTO: 0.43 THOUSAND/ΜL (ref 0.17–1.22)
MONOCYTES NFR BLD AUTO: 4 % (ref 4–12)
NEUTROPHILS # BLD AUTO: 10.36 THOUSANDS/ΜL (ref 1.85–7.62)
NEUTS SEG NFR BLD AUTO: 83 % (ref 43–75)
NRBC BLD AUTO-RTO: 0 /100 WBCS
PLATELET # BLD AUTO: 242 THOUSANDS/UL (ref 149–390)
PMV BLD AUTO: 10.1 FL (ref 8.9–12.7)
POTASSIUM SERPL-SCNC: 3.8 MMOL/L (ref 3.5–5.3)
PROT SERPL-MCNC: 7.3 G/DL (ref 6.4–8.2)
PROTHROMBIN TIME: 13.3 SECONDS (ref 11.6–14.5)
RBC # BLD AUTO: 4.21 MILLION/UL (ref 3.81–5.12)
SODIUM SERPL-SCNC: 139 MMOL/L (ref 136–145)
WBC # BLD AUTO: 12.43 THOUSAND/UL (ref 4.31–10.16)

## 2019-10-20 PROCEDURE — 85730 THROMBOPLASTIN TIME PARTIAL: CPT | Performed by: EMERGENCY MEDICINE

## 2019-10-20 PROCEDURE — 74176 CT ABD & PELVIS W/O CONTRAST: CPT

## 2019-10-20 PROCEDURE — 96374 THER/PROPH/DIAG INJ IV PUSH: CPT

## 2019-10-20 PROCEDURE — 96375 TX/PRO/DX INJ NEW DRUG ADDON: CPT

## 2019-10-20 PROCEDURE — 99285 EMERGENCY DEPT VISIT HI MDM: CPT

## 2019-10-20 PROCEDURE — 80076 HEPATIC FUNCTION PANEL: CPT | Performed by: EMERGENCY MEDICINE

## 2019-10-20 PROCEDURE — 85610 PROTHROMBIN TIME: CPT | Performed by: EMERGENCY MEDICINE

## 2019-10-20 PROCEDURE — 83690 ASSAY OF LIPASE: CPT | Performed by: EMERGENCY MEDICINE

## 2019-10-20 PROCEDURE — 36415 COLL VENOUS BLD VENIPUNCTURE: CPT | Performed by: EMERGENCY MEDICINE

## 2019-10-20 PROCEDURE — 96361 HYDRATE IV INFUSION ADD-ON: CPT

## 2019-10-20 PROCEDURE — 85025 COMPLETE CBC W/AUTO DIFF WBC: CPT | Performed by: EMERGENCY MEDICINE

## 2019-10-20 PROCEDURE — 80048 BASIC METABOLIC PNL TOTAL CA: CPT | Performed by: EMERGENCY MEDICINE

## 2019-10-20 RX ORDER — KETOROLAC TROMETHAMINE 30 MG/ML
15 INJECTION, SOLUTION INTRAMUSCULAR; INTRAVENOUS ONCE
Status: COMPLETED | OUTPATIENT
Start: 2019-10-20 | End: 2019-10-20

## 2019-10-20 RX ORDER — METOCLOPRAMIDE HYDROCHLORIDE 5 MG/ML
10 INJECTION INTRAMUSCULAR; INTRAVENOUS ONCE
Status: COMPLETED | OUTPATIENT
Start: 2019-10-20 | End: 2019-10-20

## 2019-10-20 RX ADMIN — SODIUM CHLORIDE 1000 ML: 0.9 INJECTION, SOLUTION INTRAVENOUS at 23:02

## 2019-10-20 RX ADMIN — METOCLOPRAMIDE 10 MG: 5 INJECTION, SOLUTION INTRAMUSCULAR; INTRAVENOUS at 23:01

## 2019-10-20 RX ADMIN — KETOROLAC TROMETHAMINE 15 MG: 30 INJECTION, SOLUTION INTRAMUSCULAR at 23:01

## 2019-10-21 VITALS
WEIGHT: 290 LBS | SYSTOLIC BLOOD PRESSURE: 146 MMHG | BODY MASS INDEX: 48.32 KG/M2 | HEART RATE: 82 BPM | HEIGHT: 65 IN | TEMPERATURE: 98.1 F | DIASTOLIC BLOOD PRESSURE: 66 MMHG | RESPIRATION RATE: 18 BRPM | OXYGEN SATURATION: 96 %

## 2019-10-21 PROCEDURE — 99285 EMERGENCY DEPT VISIT HI MDM: CPT | Performed by: EMERGENCY MEDICINE

## 2019-10-21 RX ORDER — DOCUSATE SODIUM 100 MG/1
100 CAPSULE, LIQUID FILLED ORAL 2 TIMES DAILY PRN
Qty: 60 CAPSULE | Refills: 0 | Status: SHIPPED | OUTPATIENT
Start: 2019-10-21 | End: 2019-12-03 | Stop reason: ALTCHOICE

## 2019-10-21 RX ORDER — DICYCLOMINE HCL 20 MG
20 TABLET ORAL EVERY 6 HOURS PRN
Qty: 50 TABLET | Refills: 0 | Status: SHIPPED | OUTPATIENT
Start: 2019-10-20 | End: 2019-12-03 | Stop reason: ALTCHOICE

## 2019-10-21 NOTE — ED PROVIDER NOTES
History  Chief Complaint   Patient presents with    Rectal Bleeding     Pt presents to ED with rectal bleeding worsening for weeks  Pt was seen here for same and follwed up with GI who cancelled colonoscpy  Pt reports dizziness, weakness and headache      HPI    Prior to Admission Medications   Prescriptions Last Dose Informant Patient Reported? Taking? EPINEPHrine (EPIPEN) 0 3 mg/0 3 mL SOAJ   Yes No   Sig: daily as needed   OXcarbazepine (TRILEPTAL) 300 mg tablet  Self No No   Sig: Take 1 tablet (300 mg total) by mouth every 12 (twelve) hours Three p o  q a m  and 4 p o  q p m  Patient taking differently: Take 900 mg by mouth every 12 (twelve) hours Three p o  q a m  and 4 p o  q p m    albuterol (PROVENTIL HFA,VENTOLIN HFA) 90 mcg/act inhaler  Self Yes No   Sig: daily as needed    dicyclomine (BENTYL) 20 mg tablet   No No   Sig: Take 1 tablet (20 mg total) by mouth 2 (two) times a day As needed for abdominal pain/cramping   hydrocortisone 1 % cream   No No   Sig: Apply to rectal area daily    Preparation H    ibuprofen (MOTRIN) 400 mg tablet  Self Yes No   Sig: Take 400 mg by mouth daily as needed for mild pain   levonorgestrel (MIRENA, 52 MG,) 20 MCG/24HR IUD   Yes No   Sig: by Intrauterine route   losartan-hydrochlorothiazide (HYZAAR) 100-12 5 MG per tablet  Self Yes No   Sig: Take 1 tablet by mouth daily    ondansetron (ZOFRAN-ODT) 4 mg disintegrating tablet   No No   Sig: Take 1 tablet (4 mg total) by mouth every 6 (six) hours as needed for nausea or vomiting   predniSONE 10 mg tablet   No No   Sig: Six pills daily x2 weeks then decreased to 5 pills daily   Patient taking differently: Take 20 mg by mouth every morning Six pills daily x2 weeks then decreased to 5 pills daily      Facility-Administered Medications: None       Past Medical History:   Diagnosis Date    Anemia     Arthritis     Asthma     Baker's cyst     Chronic hip pain, left     CTS (carpal tunnel syndrome)     Facial pain     Hypertension     Knee pain     Shoulder injury     Trigeminal neuralgia        Past Surgical History:   Procedure Laterality Date     SECTION      HERNIA REPAIR      TOOTH EXTRACTION      TUBAL LIGATION         Family History   Problem Relation Age of Onset    Diabetes Mother     Lung cancer Mother     Diabetes Father     Throat cancer Father     Diabetes Sister     Heart disease Sister     Alzheimer's disease Maternal Grandmother      I have reviewed and agree with the history as documented  Social History     Tobacco Use    Smoking status: Former Smoker    Smokeless tobacco: Never Used    Tobacco comment: quit 15 yrs ago   Substance Use Topics    Alcohol use: Yes     Comment: social    Drug use: No        Review of Systems    Physical Exam  Physical Exam   Constitutional: She is oriented to person, place, and time  She appears well-developed and well-nourished  No distress  Morbidly obese   HENT:   Head: Normocephalic and atraumatic  Mouth/Throat: Oropharynx is clear and moist    Eyes: Pupils are equal, round, and reactive to light  Conjunctivae are normal    Neck: Normal range of motion  No tracheal deviation present  Cardiovascular: Normal rate, regular rhythm, normal heart sounds and intact distal pulses  Pulmonary/Chest: Effort normal and breath sounds normal  No respiratory distress  Abdominal: Soft  Bowel sounds are normal  She exhibits no distension  There is tenderness (mild) in the right upper quadrant, epigastric area and left upper quadrant  There is no rigidity, no rebound, no guarding and negative Batista's sign  Well healed surgical incision   Genitourinary: Rectal exam shows fissure and tenderness  Rectal exam shows no external hemorrhoid, no internal hemorrhoid, no mass and anal tone normal          Genitourinary Comments: Small amount of light brown stool on rectal exam   No gross blood  Neurological: She is alert and oriented to person, place, and time  She has normal strength  GCS eye subscore is 4  GCS verbal subscore is 5  GCS motor subscore is 6  Skin: Skin is warm and dry  Psychiatric: She has a normal mood and affect  Her behavior is normal    Nursing note and vitals reviewed        Vital Signs  ED Triage Vitals [10/20/19 2128]   Temperature Pulse Respirations Blood Pressure SpO2   98 1 °F (36 7 °C) 91 18 (!) 197/88 99 %      Temp Source Heart Rate Source Patient Position - Orthostatic VS BP Location FiO2 (%)   Oral Monitor Lying Right arm --      Pain Score       Worst Possible Pain           Vitals:    10/20/19 2128   BP: (!) 197/88   Pulse: 91   Patient Position - Orthostatic VS: Lying         Visual Acuity      ED Medications  Medications   sodium chloride 0 9 % bolus 1,000 mL (1,000 mL Intravenous New Bag 10/20/19 2302)   ketorolac (TORADOL) injection 15 mg (15 mg Intravenous Given 10/20/19 2301)   metoclopramide (REGLAN) injection 10 mg (10 mg Intravenous Given 10/20/19 2301)       Diagnostic Studies  Results Reviewed     Procedure Component Value Units Date/Time    Basic metabolic panel [[id number]]  (Abnormal) Collected:  10/20/19 2258    Lab Status:  Final result Specimen:  Blood from Arm, Right Updated:  10/20/19 2318     Sodium 139 mmol/L      Potassium 3 8 mmol/L      Chloride 100 mmol/L      CO2 28 mmol/L      ANION GAP 11 mmol/L      BUN 18 mg/dL      Creatinine 0 86 mg/dL      Glucose 182 mg/dL      Calcium 8 9 mg/dL      eGFR 86 ml/min/1 73sq m     Narrative:       [place] guidelines for Chronic Kidney Disease (CKD):     Stage 1 with normal or high GFR (GFR > 90 mL/min/1 73 square meters)    Stage 2 Mild CKD (GFR = 60-89 mL/min/1 73 square meters)    Stage 3A Moderate CKD (GFR = 45-59 mL/min/1 73 square meters)    Stage 3B Moderate CKD (GFR = 30-44 mL/min/1 73 square meters)    Stage 4 Severe CKD (GFR = 15-29 mL/min/1 73 square meters)    Stage 5 End Stage CKD (GFR <15 mL/min/1 73 square meters)  Note: GFR calculation is accurate only with a steady state creatinine    Hepatic function panel [[id number]]  (Abnormal) Collected:  10/20/19 2258    Lab Status:  Final result Specimen:  Blood from Arm, Right Updated:  10/20/19 2318     Total Bilirubin 0 20 mg/dL      Bilirubin, Direct 0 06 mg/dL      Alkaline Phosphatase 105 U/L      AST 13 U/L      ALT 21 U/L      Total Protein 7 3 g/dL      Albumin 3 1 g/dL     Lipase [[id number]]  (Normal) Collected:  10/20/19 2258    Lab Status:  Final result Specimen:  Blood from Arm, Right Updated:  10/20/19 2318     Lipase 76 u/L     Protime-INR [[id number]]  (Normal) Collected:  10/20/19 2258    Lab Status:  Final result Specimen:  Blood from Arm, Right Updated:  10/20/19 2316     Protime 13 3 seconds      INR 1 01    APTT [[id number]]  (Normal) Collected:  10/20/19 2258    Lab Status:  Final result Specimen:  Blood from Arm, Right Updated:  10/20/19 2316     PTT 30 seconds     CBC and differential [[id number]]  (Abnormal) Collected:  10/20/19 2258    Lab Status:  Final result Specimen:  Blood from Arm, Right Updated:  10/20/19 2304     WBC 12 43 Thousand/uL      RBC 4 21 Million/uL      Hemoglobin 11 7 g/dL      Hematocrit 37 2 %      MCV 88 fL      MCH 27 8 pg      MCHC 31 5 g/dL      RDW 14 4 %      MPV 10 1 fL      Platelets 179 Thousands/uL      nRBC 0 /100 WBCs      Neutrophils Relative 83 %      Immat GRANS % 2 %      Lymphocytes Relative 11 %      Monocytes Relative 4 %      Eosinophils Relative 0 %      Basophils Relative 0 %      Neutrophils Absolute 10 36 Thousands/µL      Immature Grans Absolute 0 25 Thousand/uL      Lymphocytes Absolute 1 33 Thousands/µL      Monocytes Absolute 0 43 Thousand/µL      Eosinophils Absolute 0 01 Thousand/µL      Basophils Absolute 0 05 Thousands/µL                  CT abdomen pelvis wo contrast   Final Result by Saúl Maria MD (10/20 2332)      Right ovarian 3 0 x 2 8 x 4 4 cm cystic lesion for which ultrasound in the appropriate clinical setting  Infraumbilical hernia repair mesh is noted  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         The study was marked in EPIC for significant notification  Workstation performed: [id number]                    Procedures  Procedures       ED Course                               MDM  Number of Diagnoses or Management Options  Anal fissure: new and requires workup  BRBPR (bright red blood per rectum): new and requires workup  Headache: new and requires workup  Rectal pain: new and requires workup  Upper abdominal pain: new and requires workup  Diagnosis management comments: This is a 69-year-old female who presents here today with abdominal pain and rectal bleeding  She states she was seen here several weeks ago for similar bleeding and was advised to follow up with the GI doctor  She was seen about three weeks ago, but due to insurance issues was unable to have the colonoscopy scheduled at the facility they were initially going to do at, so was unable to be done until next month  She states it has been persistent, with each bowel movement is bright red blood around stool  She does note occasional blood clots over the past couple of weeks  She denies any melanotic stools  She denies any problems with chronic constipation or with diarrhea  She does note rectal pain with bowel movements over the past week  She does endorse upper abdominal cramping diffusely for the past week  She denies any fevers, nausea or vomiting  She denies any prior problems with rectal bleeding  She denies any problems with reflux, ulcers, indigestion  She had a colonoscopy at 40, and is uncertain of why it was done early but was told this was normal   She does not take any blood thinning medications  She has had several abdominal hernia repairs but denies any other abdominal surgeries    She does have a history of chronic headaches comma and states for the past several days her headache has been worse than it usually is  She is having worse photophobia with this  She does endorse feeling dizzy and run down recently  She denies any lightheadedness, syncope or presyncope, chest pain, shortness of breath, dyspnea on exertion, palpitations  She denies any focal neurologic deficits  She has no recent head injuries  She has no fevers or URI symptoms  She denies any known sick contacts or bad food exposures  She has no prior problems with chronic abdominal pain  She denies any hematuria, vaginal bleeding, nose bleeds, other areas of easy bleeding or bruising  Review of systems:  Otherwise negative unless stated as above    She is well-appearing, in no acute distress  She does have tenderness across the upper abdomen without peritoneal signs  She has a small anal fissure in the posterior aspect with tender skin around this area  She does have significant tenderness on rectal exam   She has no obvious palpable internal hernias or masses  She has a small amount of light brown stool without any gross blood on rectal exam   She has no focal neurologic deficits  Exam is otherwise unremarkable  There is a note from Neurology stating that her prednisone dose was recently decreased, which could be contributing to her headaches  I am not concerned about underlying meningitis or encephalitis, subarachnoid hemorrhage or other intracranial hemorrhage or space-occupying lesion  This could be contributing to her complaints of dizziness and generalized weakness  I do not feel that she needs any imaging to evaluate this, however we will treat her symptoms  Regarding her rectal bleeding, this could be due to internal hemorrhoid or fissure, could be due to developing colitis or diverticulitis especially given recent pain and new clots  We will repeat blood work to evaluate for signs of anemia, electrolyte abnormality, coagulation issues contributing to her bleeding    We will repeat CT scan to evaluate for any acute abnormalities, especially given new pain  We will treat her symptoms while here  CT scan shows ovarian cyst splenic artery pseudoaneurysm seen at last visit but no other acute abnormalities to explain her pain  Lab work is unremarkable  Hemoglobin was 12 2 at last visit shows essentially unchanged  She does have a mild leukocytosis of 12, which could reflect possible viral GI illness contributing to her cramping, though she is having no other infectious symptoms  I discussed with the patient findings, uncertain exact etiology of her symptoms, need for follow-up with GI for further evaluation and colonoscopy, and indications for return, and she expresses understanding with this plan          Amount and/or Complexity of Data Reviewed  Clinical lab tests: reviewed and ordered  Tests in the radiology section of CPT®: reviewed and ordered  Decide to obtain previous medical records or to obtain history from someone other than the patient: yes  Review and summarize past medical records: yes  Independent visualization of images, tracings, or specimens: yes        Disposition  Final diagnoses:   BRBPR (bright red blood per rectum)   Anal fissure   Upper abdominal pain   Headache   Rectal pain     Time reflects when diagnosis was documented in both MDM as applicable and the Disposition within this note     Time User Action Codes Description Comment    10/20/2019 11:49 PM Adeel Jo [K62 5] BRBPR (bright red blood per rectum)     10/20/2019 11:49 PM Adeel Jo [K60 2] Anal fissure     10/20/2019 11:49 PM Adeel Jo [R10 10] Upper abdominal pain     10/20/2019 11:50 PM Adeel Jo [R51] Headache     10/21/2019 12:02 AM Apolinar Jo [K62 89] Rectal pain       ED Disposition     ED Disposition Condition Date/Time Comment    Discharge Good Sun Oct 20, 2019 11:49 PM Syl Adhikari discharge to home/self care upp      Follow-up Information     Follow up With Specialties Details Why Contact Info Additional [facility] Schedule an appointment as soon as possible for a visit  to follow up with a GI doctor [id number] [facility] [address]  [address] [place] [phone] [address] [facility] [place] [address] [facility]  [address] [place], [place], [address]           Patient's Medications   Discharge Prescriptions    DICYCLOMINE (BENTYL) 20 MG TABLET    Take 1 tablet (20 mg total) by mouth every 6 (six) hours as needed (abdominal pain)       Start Date: 10/20/2019End Date: --       Order Dose: 20 mg       Quantity: 50 tablet    Refills: 0    DOCUSATE SODIUM (COLACE) 100 MG CAPSULE    Take 1 capsule (100 mg total) by mouth 2 (two) times a day as needed (bloody or painful bowel movements)       Start Date: 10/21/2019End Date: --       Order Dose: 100 mg       Quantity: 60 capsule    Refills: 0    PRAMOXINE-HYDROCORTISONE (ANALPRAM-HC) 1-1 % RECTAL CREAM    Insert into the rectum 2 (two) times a day as needed (rectal pain, rectal bleeding)       Start Date: 10/21/2019End Date: --       Order Dose: --       Quantity: 30 g    Refills: 0         ED Provider  Electronically Signed by           Dorothy Sandhoff, MD  10/21/19 8931

## 2019-11-10 DIAGNOSIS — M31.6 TEMPORAL ARTERITIS (HCC): ICD-10-CM

## 2019-11-11 RX ORDER — PREDNISONE 10 MG/1
TABLET ORAL
Qty: 180 TABLET | Refills: 0 | OUTPATIENT
Start: 2019-11-11

## 2019-11-11 NOTE — TELEPHONE ENCOUNTER
Patrick Hawthorne MD  Neurology [facility] Clinical 1 hour ago (9:37 AM)      Please check the dosage it looks from Dr Kathi العلي patient is on a different dose of prednisone and send the correct dosage for the refill          Left a message to call the office back

## 2019-12-03 ENCOUNTER — OFFICE VISIT (OUTPATIENT)
Dept: NEUROLOGY | Facility: CLINIC | Age: 59
End: 2019-12-03
Payer: COMMERCIAL

## 2019-12-03 VITALS
BODY MASS INDEX: 48.82 KG/M2 | HEIGHT: 65 IN | WEIGHT: 293 LBS | DIASTOLIC BLOOD PRESSURE: 80 MMHG | HEART RATE: 72 BPM | SYSTOLIC BLOOD PRESSURE: 134 MMHG

## 2019-12-03 DIAGNOSIS — M31.6 TEMPORAL ARTERITIS (HCC): ICD-10-CM

## 2019-12-03 DIAGNOSIS — G50.0 TRIGEMINAL NEURALGIA: Primary | ICD-10-CM

## 2019-12-03 DIAGNOSIS — R41.3 MEMORY DIFFICULTY: ICD-10-CM

## 2019-12-03 PROCEDURE — 99214 OFFICE O/P EST MOD 30 MIN: CPT | Performed by: PSYCHIATRY & NEUROLOGY

## 2019-12-03 NOTE — PROGRESS NOTES
Kwesi Soliman is a 63 y o  female returns in follow-up today with history of trigeminal neuralgia and temporal arteritis    Assessment:  1  Trigeminal neuralgia    2  Temporal arteritis (HCC)        Plan:  Continue oxcarbazepine  Follow-up 6 months    Discussion:  Mera's trigeminal neuralgia remains under good control with oxcarbazepine  She has not had any headaches or scalp tenderness  She independently discontinued her prednisone in recent weeks as she states it was causing too much weight gain  She had a sedimentation rate done in the beginning of November an outside facility and it was elevated at 45  I just realized that was done today because they did not send the results to me  I explained to her that it is important to have good communication to have a good doctor-patient relationship  It was suspected that she had trigeminal neuralgia given her symptoms and her very high sedimentation rate however she was treated by another physician before a temporal artery ultrasound or biopsy could be performed  She has no symptoms currently of trigeminal neuralgia and does not want to be on prednisone  I explained to her that if her headache symptoms recur she will need to see a rheumatologist   She continues to have some memory issues will continue to monitor these  I will see her back in follow-up in 6 months      Subjective:    HUNTER  Allan Melo returns in follow-up today  She reports that her facial pain symptoms remain under good control with Trileptal   She denies any headaches or scalp tenderness  She discontinued her prednisone independently since our last discussion 2 months ago when she was instructed to not go below 15 mg daily  She reports that she was gaining too much weight and could not stop eating  She understands that we were treating what was presumed to be temporal arteritis given her very high sedimentation rate and headaches/scalp tenderness    I explained that her in the past this condition can cause strokes and blindness if not adequately treated  She continues reports some problems with memory and has kept up with the exercises from speech therapy      Past Medical History:   Diagnosis Date    Anemia     Arthritis     Asthma     Baker's cyst     Chronic hip pain, left     CTS (carpal tunnel syndrome)     Diabetes mellitus ([address] )     Facial pain     Hypertension     Knee pain     Shoulder injury     Trigeminal neuralgia        Family History:  Family History   Problem Relation Age of Onset    Diabetes Mother     Lung cancer Mother     Diabetes Father     Throat cancer Father     Diabetes Sister     Heart disease Sister     Alzheimer's disease Maternal Grandmother        Past Surgical History:  Past Surgical History:   Procedure Laterality Date     SECTION      HERNIA REPAIR      TOOTH EXTRACTION      TUBAL LIGATION         Social History:   reports that she has quit smoking  She has never used smokeless tobacco  She reports that she drinks alcohol  She reports that she does not use drugs  Allergies:  [place] [fish oil]; Eggs or egg-derived products; Iodine; Other; Peanut-containing drug products; Shellfish-derived products; and Amoxicillin      Current Outpatient Medications:     albuterol (PROVENTIL HFA,VENTOLIN HFA) 90 mcg/act inhaler, daily as needed , Disp: , Rfl:     EPINEPHrine (EPIPEN) 0 3 mg/0 3 mL SOAJ, daily as needed, Disp: , Rfl: 0    hydrocortisone 1 % cream, Apply to rectal area daily   Preparation H , Disp: 15 g, Rfl: 0    ibuprofen (MOTRIN) 400 mg tablet, Take 400 mg by mouth daily as needed for mild pain, Disp: , Rfl:     levonorgestrel (MIRENA, 52 MG,) 20 MCG/24HR IUD, by Intrauterine route, Disp: , Rfl:     losartan-hydrochlorothiazide (HYZAAR) 100-12 5 MG per tablet, Take 1 tablet by mouth daily , Disp: , Rfl:     metFORMIN (GLUCOPHAGE) 500 mg tablet, Take 1 tablet by mouth 2 (two) times a day, Disp: , Rfl:     OXcarbazepine (TRILEPTAL) 300 mg tablet, Take 1 tablet (300 mg total) by mouth every 12 (twelve) hours Three p o  q a m  and 4 p o  q p m  (Patient taking differently: Take 900 mg by mouth every 12 (twelve) hours Three p o  q a m  and 4 p o  q p m ), Disp: 210 tablet, Rfl: 5    pramoxine-hydrocortisone (ANALPRAM-HC) 1-1 % rectal cream, Insert into the rectum 2 (two) times a day as needed (rectal pain, rectal bleeding), Disp: 30 g, Rfl: 0    I have reviewed the past medical, social and family history, current medications, allergies, vitals, review of systems and updated this information as appropriate today     Objective:    Vitals:  Blood pressure 134/80, pulse 72, height 5' 5" (1 651 m), weight 136 kg (300 lb)  Physical Exam    Neurological Exam  GENERAL:  Well-developed well-nourished woman in no acute distress  HEENT/NECK: Head is atraumatic normocephalic without scalp tenderness, neck is supple  NEUROLOGIC:  Mental Status: Awake and alert without aphasia  Cranial Nerves: Extraocular movements are full  Face is symmetrical  Coordination:  She ambulates with an antalgic gait pattern            ROS:    Review of Systems   Constitutional: Positive for fatigue  Negative for appetite change, chills and fever  HENT: Positive for voice change  Negative for hearing loss, tinnitus and trouble swallowing  Eyes: Negative for photophobia, pain and visual disturbance  Left eye watery      Respiratory: Positive for shortness of breath and wheezing  Cardiovascular: Negative  Negative for palpitations  Gastrointestinal: Positive for nausea  Negative for vomiting  Endocrine: Negative  Negative for cold intolerance and heat intolerance  Genitourinary: Negative  Negative for dysuria, frequency and urgency  Musculoskeletal: Positive for back pain (lower) and neck pain  Negative for arthralgias, gait problem, myalgias and neck stiffness  Skin: Negative  Negative for rash  Allergic/Immunologic: Negative  Neurological: Positive for headaches  Negative for dizziness, tremors, seizures, syncope, facial asymmetry, speech difficulty, weakness, light-headedness and numbness  Hematological: Negative  Does not bruise/bleed easily  Psychiatric/Behavioral: Positive for decreased concentration (memory) and sleep disturbance (difficulty falling asleep)  Negative for confusion and hallucinations

## 2020-02-03 ENCOUNTER — TELEPHONE (OUTPATIENT)
Dept: NEUROLOGY | Facility: CLINIC | Age: 60
End: 2020-02-03

## 2020-02-03 DIAGNOSIS — G50.0 TRIGEMINAL NEURALGIA: ICD-10-CM

## 2020-02-03 RX ORDER — OXCARBAZEPINE 300 MG/1
300 TABLET, FILM COATED ORAL EVERY 12 HOURS SCHEDULED
Qty: 210 TABLET | Refills: 5 | Status: SHIPPED | OUTPATIENT
Start: 2020-02-03 | End: 2020-02-03 | Stop reason: SDUPTHER

## 2020-02-03 RX ORDER — OXCARBAZEPINE 300 MG/1
TABLET, FILM COATED ORAL
Qty: 210 TABLET | Refills: 5 | Status: SHIPPED | OUTPATIENT
Start: 2020-02-03 | End: 2020-06-03 | Stop reason: ALTCHOICE

## 2020-05-08 RX ORDER — OXCARBAZEPINE 300 MG/1
900 TABLET, FILM COATED ORAL EVERY 12 HOURS SCHEDULED
Qty: 210 TABLET | Refills: 5 | Status: SHIPPED | OUTPATIENT
Start: 2020-05-08 | End: 2020-11-16

## 2020-06-03 ENCOUNTER — OFFICE VISIT (OUTPATIENT)
Dept: NEUROLOGY | Facility: CLINIC | Age: 60
End: 2020-06-03
Payer: COMMERCIAL

## 2020-06-03 VITALS
DIASTOLIC BLOOD PRESSURE: 66 MMHG | SYSTOLIC BLOOD PRESSURE: 130 MMHG | HEART RATE: 70 BPM | WEIGHT: 245 LBS | HEIGHT: 65 IN | BODY MASS INDEX: 40.82 KG/M2

## 2020-06-03 DIAGNOSIS — G50.0 TRIGEMINAL NEURALGIA: Primary | ICD-10-CM

## 2020-06-03 PROCEDURE — 99213 OFFICE O/P EST LOW 20 MIN: CPT | Performed by: PSYCHIATRY & NEUROLOGY

## 2020-06-03 RX ORDER — ACETAMINOPHEN 325 MG/1
650 TABLET ORAL 2 TIMES DAILY PRN
COMMUNITY

## 2020-06-03 RX ORDER — MULTIVIT WITH MINERALS/LUTEIN
1000 TABLET ORAL DAILY
COMMUNITY

## 2020-06-03 RX ORDER — ASPIRIN 81 MG/1
81 TABLET ORAL DAILY
COMMUNITY

## 2020-11-14 DIAGNOSIS — G50.0 TRIGEMINAL NEURALGIA: ICD-10-CM

## 2020-11-15 ENCOUNTER — HOSPITAL ENCOUNTER (EMERGENCY)
Facility: HOSPITAL | Age: 60
Discharge: HOME | End: 2020-11-15
Attending: EMERGENCY MEDICINE | Admitting: EMERGENCY MEDICINE
Payer: COMMERCIAL

## 2020-11-15 VITALS
WEIGHT: 246.91 LBS | RESPIRATION RATE: 19 BRPM | SYSTOLIC BLOOD PRESSURE: 191 MMHG | HEART RATE: 70 BPM | DIASTOLIC BLOOD PRESSURE: 79 MMHG | OXYGEN SATURATION: 100 % | BODY MASS INDEX: 41.09 KG/M2 | TEMPERATURE: 97.2 F

## 2020-11-15 DIAGNOSIS — R68.89 FLU-LIKE SYMPTOMS: Primary | ICD-10-CM

## 2020-11-15 DIAGNOSIS — Z20.822 SUSPECTED COVID-19 VIRUS INFECTION: ICD-10-CM

## 2020-11-15 PROCEDURE — U0003 INFECTIOUS AGENT DETECTION BY NUCLEIC ACID (DNA OR RNA); SEVERE ACUTE RESPIRATORY SYNDROME CORONAVIRUS 2 (SARS-COV-2) (CORONAVIRUS DISEASE [COVID-19]), AMPLIFIED PROBE TECHNIQUE, MAKING USE OF HIGH THROUGHPUT TECHNOLOGIES AS DESCRIBED BY CMS-2020-01-R: HCPCS | Performed by: PHYSICIAN ASSISTANT

## 2020-11-15 PROCEDURE — 99282 EMERGENCY DEPT VISIT SF MDM: CPT | Performed by: PHYSICIAN ASSISTANT

## 2020-11-15 PROCEDURE — 99283 EMERGENCY DEPT VISIT LOW MDM: CPT

## 2020-11-16 RX ORDER — OXCARBAZEPINE 300 MG/1
300 TABLET, FILM COATED ORAL EVERY 12 HOURS SCHEDULED
Qty: 210 TABLET | Refills: 5 | Status: SHIPPED | OUTPATIENT
Start: 2020-11-16 | End: 2021-05-13

## 2020-11-17 LAB — SARS-COV-2 RNA SPEC QL NAA+PROBE: NOT DETECTED

## 2020-12-07 ENCOUNTER — OFFICE VISIT (OUTPATIENT)
Dept: NEUROLOGY | Facility: CLINIC | Age: 60
End: 2020-12-07
Payer: COMMERCIAL

## 2020-12-07 VITALS
SYSTOLIC BLOOD PRESSURE: 120 MMHG | DIASTOLIC BLOOD PRESSURE: 58 MMHG | HEIGHT: 65 IN | BODY MASS INDEX: 44.32 KG/M2 | WEIGHT: 266 LBS | HEART RATE: 79 BPM

## 2020-12-07 DIAGNOSIS — G50.0 TRIGEMINAL NEURALGIA: Primary | ICD-10-CM

## 2020-12-07 PROCEDURE — 99213 OFFICE O/P EST LOW 20 MIN: CPT | Performed by: PSYCHIATRY & NEUROLOGY

## 2020-12-07 RX ORDER — BIOTIN 1 MG
1 TABLET ORAL DAILY
COMMUNITY

## 2021-05-13 DIAGNOSIS — G50.0 TRIGEMINAL NEURALGIA: ICD-10-CM

## 2021-05-13 RX ORDER — OXCARBAZEPINE 300 MG/1
TABLET, FILM COATED ORAL
Qty: 210 TABLET | Refills: 5 | Status: SHIPPED | OUTPATIENT
Start: 2021-05-13 | End: 2021-09-13 | Stop reason: SDUPTHER

## 2021-06-11 ENCOUNTER — OFFICE VISIT (OUTPATIENT)
Dept: NEUROLOGY | Facility: CLINIC | Age: 61
End: 2021-06-11
Payer: MEDICARE

## 2021-06-11 VITALS
WEIGHT: 278 LBS | HEART RATE: 89 BPM | DIASTOLIC BLOOD PRESSURE: 76 MMHG | HEIGHT: 65 IN | SYSTOLIC BLOOD PRESSURE: 130 MMHG | BODY MASS INDEX: 46.32 KG/M2

## 2021-06-11 DIAGNOSIS — G50.0 TRIGEMINAL NEURALGIA: Primary | ICD-10-CM

## 2021-06-11 PROCEDURE — 99213 OFFICE O/P EST LOW 20 MIN: CPT | Performed by: PSYCHIATRY & NEUROLOGY

## 2021-06-11 NOTE — PROGRESS NOTES
Mani Flynn is a 62 y o  female Returns in follow-up today with history of facial pain    Assessment:  1  Trigeminal neuralgia        Plan:   continue Trileptal   Follow-up 6 months    Discussion:   Antonia Haynes reports trigeminal neuralgia pain symptoms are under good control with Trileptal as long as she remembers to take her medication   She has what sounds like occasional ice pick type headaches  She will continue present management and I will see her back in follow-up in 6 months  Subjective:    HPI    Antonia Haynes returns in follow-up today  She reports that she has no facial pain symptoms as long as she remembers to take her medication  She states on occasion she misses a dose or 2 and when this occurs the facial pain recurs  She notes no adverse effects from her medication  She she states infrequently she has a sharp stabbing type pain symptom in the temple region  It lasts for seconds and then goes away    Overall she reports her health has been good      Past Medical History:   Diagnosis Date    Anemia     Arthritis     Asthma     Baker's cyst     Chronic hip pain, left     CTS (carpal tunnel syndrome)     Diabetes mellitus ([facility] [address] )     Facial pain     Head injury     work injury- tripped into an elevator hitting her right temple on a metal hand railing    History of bilateral knee replacement 2020    Hypertension     Knee pain     Shoulder injury     Trigeminal neuralgia        Family History:  Family History   Problem Relation Age of Onset    Diabetes Mother     Lung cancer Mother     Diabetes Father     Throat cancer Father     Diabetes Sister     Heart disease Sister     Alzheimer's disease Maternal Grandmother        Past Surgical History:  Past Surgical History:   Procedure Laterality Date     SECTION      HERNIA REPAIR      KNEE SURGERY  2020    Total knee replacement both knees    TOOTH EXTRACTION      TUBAL LIGATION         Social History:   reports that she has quit smoking  She has never used smokeless tobacco  She reports previous alcohol use  She reports that she does not use drugs  Allergies:  [place] [fish oil - food allergy], Shellfish-derived products - food allergy, Eggs or egg-derived products - food allergy, Iodine - food allergy, Lactose - food allergy, Other, Peanut-containing drug products - food allergy, and Amoxicillin      Current Outpatient Medications:     acetaminophen (TYLENOL) 325 mg tablet, Take 650 mg by mouth 2 (two) times a day as needed , Disp: , Rfl:     albuterol (PROVENTIL HFA,VENTOLIN HFA) 90 mcg/act inhaler, daily as needed , Disp: , Rfl:     Ascorbic Acid (VITAMIN C) 1000 MG tablet, Take 1,000 mg by mouth daily, Disp: , Rfl:     aspirin (ECOTRIN LOW STRENGTH) 81 mg EC tablet, Take 81 mg by mouth daily, Disp: , Rfl:     Cholecalciferol (Vitamin D3) 25 MCG (1000 UT) CAPS, Take 1 tablet by mouth daily, Disp: , Rfl:     EPINEPHrine (EPIPEN) 0 3 mg/0 3 mL SOAJ, daily as needed, Disp: , Rfl: 0    hydrocortisone 1 % cream, Apply to rectal area daily  Preparation H  (Patient taking differently: 4 (four) times a day as needed Apply to rectal area daily  Preparation H ), Disp: 15 g, Rfl: 0    levonorgestrel (MIRENA, 52 MG,) 20 MCG/24HR IUD, by Intrauterine route, Disp: , Rfl:     losartan-hydrochlorothiazide (HYZAAR) 100-12 5 MG per tablet, Take 1 tablet by mouth daily , Disp: , Rfl:     metFORMIN (GLUCOPHAGE) 500 mg tablet, Take 1 tablet by mouth 2 (two) times a day, Disp: , Rfl:     OXcarbazepine (TRILEPTAL) 300 mg tablet, TAKE 3 TABLETS BY MOUTH EVERY MORNING AND 4 TABLETS BY MOUTH EVERY EVENING, Disp: 210 tablet, Rfl: 5     I have reviewed the past medical, social and family history, current medications, allergies, vitals, review of systems and updated this information as appropriate today     Objective:    Vitals:  Blood pressure 130/76, pulse 89, height 5' 5" (1 651 m), weight 126 kg (278 lb)      Physical Exam    Neurological Exam   GENERAL:  Well-developed well-nourished woman in no acute distress  HEENT/NECK: Head is atraumatic normocephalic, neck is supple  NEUROLOGIC:  Mental Status: Awake and alert without aphasia  Cranial Nerves: Extraocular movements are full  Face is symmetrical  Motor:  No drift is noted on arm extension  Coordination:  Finger-to-nose testing is performed accurately  Romberg is negative  Gait is stable            ROS:    Review of Systems   Constitutional: Negative  Negative for appetite change and fever  HENT: Negative  Negative for hearing loss, tinnitus, trouble swallowing and voice change  Eyes: Negative  Negative for photophobia and pain  Respiratory: Negative  Negative for shortness of breath  Cardiovascular: Negative  Negative for palpitations  Gastrointestinal: Negative  Negative for nausea and vomiting  Endocrine: Negative  Negative for cold intolerance  Genitourinary: Negative  Negative for dysuria, frequency and urgency  Musculoskeletal: Negative  Negative for myalgias and neck pain  Skin: Negative  Negative for rash  Neurological: Positive for headaches  Negative for dizziness, tremors, seizures, syncope, facial asymmetry, speech difficulty, weakness, light-headedness and numbness  Hematological: Negative  Does not bruise/bleed easily  Psychiatric/Behavioral: Negative  Negative for confusion, hallucinations and sleep disturbance

## 2021-09-13 DIAGNOSIS — G50.0 TRIGEMINAL NEURALGIA: ICD-10-CM

## 2021-09-13 RX ORDER — OXCARBAZEPINE 300 MG/1
TABLET, FILM COATED ORAL
Qty: 210 TABLET | Refills: 5 | Status: SHIPPED | OUTPATIENT
Start: 2021-09-13 | End: 2021-10-13 | Stop reason: SDUPTHER

## 2021-10-12 DIAGNOSIS — G50.0 TRIGEMINAL NEURALGIA: ICD-10-CM

## 2021-10-13 DIAGNOSIS — G50.0 TRIGEMINAL NEURALGIA: ICD-10-CM

## 2021-10-13 RX ORDER — OXCARBAZEPINE 300 MG/1
TABLET, FILM COATED ORAL
Qty: 210 TABLET | Refills: 5 | Status: SHIPPED | OUTPATIENT
Start: 2021-10-13 | End: 2021-10-13 | Stop reason: SDUPTHER

## 2021-10-14 RX ORDER — OXCARBAZEPINE 300 MG/1
TABLET, FILM COATED ORAL
Qty: 210 TABLET | Refills: 5 | Status: SHIPPED | OUTPATIENT
Start: 2021-10-14 | End: 2022-04-18

## 2021-12-09 ENCOUNTER — TELEPHONE (OUTPATIENT)
Dept: NEUROLOGY | Facility: CLINIC | Age: 61
End: 2021-12-09

## 2021-12-13 ENCOUNTER — OFFICE VISIT (OUTPATIENT)
Dept: NEUROLOGY | Facility: CLINIC | Age: 61
End: 2021-12-13
Payer: MEDICARE

## 2021-12-13 VITALS
SYSTOLIC BLOOD PRESSURE: 136 MMHG | DIASTOLIC BLOOD PRESSURE: 80 MMHG | BODY MASS INDEX: 48.15 KG/M2 | HEART RATE: 84 BPM | TEMPERATURE: 97.1 F | HEIGHT: 65 IN | WEIGHT: 289 LBS

## 2021-12-13 DIAGNOSIS — G50.0 TRIGEMINAL NEURALGIA: Primary | ICD-10-CM

## 2021-12-13 PROCEDURE — 99213 OFFICE O/P EST LOW 20 MIN: CPT | Performed by: PSYCHIATRY & NEUROLOGY

## 2022-02-18 ENCOUNTER — TELEPHONE (OUTPATIENT)
Dept: NEUROLOGY | Facility: CLINIC | Age: 62
End: 2022-02-18

## 2022-02-18 NOTE — TELEPHONE ENCOUNTER
Received a medical records request from [facility] asking for [facility] records  Faxing request to [email or web address]

## 2022-04-18 DIAGNOSIS — G50.0 TRIGEMINAL NEURALGIA: ICD-10-CM

## 2022-04-18 RX ORDER — OXCARBAZEPINE 300 MG/1
TABLET, FILM COATED ORAL
Qty: 210 TABLET | Refills: 0 | Status: SHIPPED | OUTPATIENT
Start: 2022-04-18 | End: 2022-05-23

## 2022-04-18 RX ORDER — OXCARBAZEPINE 300 MG/1
TABLET, FILM COATED ORAL
Qty: 210 TABLET | Refills: 0 | Status: SHIPPED | OUTPATIENT
Start: 2022-04-18 | End: 2022-04-18

## 2022-05-22 DIAGNOSIS — G50.0 TRIGEMINAL NEURALGIA: ICD-10-CM

## 2022-05-23 RX ORDER — OXCARBAZEPINE 300 MG/1
TABLET, FILM COATED ORAL
Qty: 210 TABLET | Refills: 0 | Status: SHIPPED | OUTPATIENT
Start: 2022-05-23 | End: 2022-06-07 | Stop reason: SDUPTHER

## 2022-06-07 DIAGNOSIS — G50.0 TRIGEMINAL NEURALGIA: ICD-10-CM

## 2022-06-07 RX ORDER — OXCARBAZEPINE 300 MG/1
TABLET, FILM COATED ORAL
Qty: 210 TABLET | Refills: 5 | Status: SHIPPED | OUTPATIENT
Start: 2022-06-07

## 2022-06-07 NOTE — TELEPHONE ENCOUNTER
Pt calling to request refill for Oxcarbazepine 300 mg  Pt takes 3 tabs in the AM and 4 tabs in the PM  Pt states she is going to be out of medication by Sunday  Last script was sent 5/23 for a 30 day supply, quantity 210  Pt states she counted and has only enough until Sunday  Pt believes she was not given full amount and says she will count her medication in front of pharmacy staff to make sure she is given correct amount  Pt missing 70 tablets  Pt has appt on 6/13/22 with Dr Riana Sheffield - Rx entered  Please review an sign if in agreement

## 2022-06-08 NOTE — TELEPHONE ENCOUNTER
Pt called to inform that the pharmacy indeed dispensed wrong amount of medication  They admitted to there mistake and provided pt with an additional 85 pills of oxcarbazepine  Pt expresses gratitude that we were able to figure out what happened  Pt just wanted Dr Wilfredo Lopez to know

## 2022-06-13 ENCOUNTER — OFFICE VISIT (OUTPATIENT)
Dept: NEUROLOGY | Facility: CLINIC | Age: 62
End: 2022-06-13
Payer: MEDICARE

## 2022-06-13 VITALS
BODY MASS INDEX: 48.68 KG/M2 | DIASTOLIC BLOOD PRESSURE: 80 MMHG | HEART RATE: 76 BPM | WEIGHT: 292.2 LBS | HEIGHT: 65 IN | SYSTOLIC BLOOD PRESSURE: 138 MMHG

## 2022-06-13 DIAGNOSIS — G50.0 TRIGEMINAL NEURALGIA: Primary | ICD-10-CM

## 2022-06-13 PROCEDURE — 99213 OFFICE O/P EST LOW 20 MIN: CPT | Performed by: PSYCHIATRY & NEUROLOGY

## 2022-06-13 NOTE — PROGRESS NOTES
Tracy Campa is a 62 y o  female returns in follow-up with history of facial pain    Assessment:  1  Trigeminal neuralgia        Plan:  Continue Trileptal   Follow-up 6 months    Discussion:  Mera reports good control facial pain symptoms with Trileptal which she tolerates well  Will continue present management and I will see her back in follow-up in 6 months      Subjective:    HUNTER  Zachary Vargas returns in follow-up today  She reports her facial pain symptoms overall under good control with present management  She does report some tiredness in the morning which she feels is related to the medication but understands that the medicine has to be taken twice a day for to be effective  She has found that if she has missed a dose she starts to get facial pain  There was a mixup with her medications at the pharmacy but this has been resolved  She denies any other health issues      Past Medical History:   Diagnosis Date    Anemia     Arthritis     Asthma     Baker's cyst     Chronic hip pain, left     CTS (carpal tunnel syndrome)     Diabetes mellitus ([address] )     Facial pain     Head injury     work injury- tripped into an elevator hitting her right temple on a metal hand railing    History of bilateral knee replacement 2020    Hypertension     Knee pain     Shoulder injury     Trigeminal neuralgia        Family History:  Family History   Problem Relation Age of Onset    Diabetes Mother     Lung cancer Mother     Diabetes Father     Throat cancer Father     Diabetes Sister     Heart disease Sister     Alzheimer's disease Maternal Grandmother        Past Surgical History:  Past Surgical History:   Procedure Laterality Date     SECTION      HERNIA REPAIR      KNEE SURGERY  2020    Total knee replacement both knees    TOOTH EXTRACTION      TUBAL LIGATION         Social History:   reports that she has quit smoking   She has never used smokeless tobacco  She reports previous alcohol use  She reports that she does not use drugs  Allergies:  [place] [fish oil - food allergy], Shellfish-derived products - food allergy, Eggs or egg-derived products - food allergy, Iodine - food allergy, Lactose - food allergy, Other, Peanut-containing drug products - food allergy, and Amoxicillin      Current Outpatient Medications:     acetaminophen (TYLENOL) 325 mg tablet, Take 650 mg by mouth 2 (two) times a day as needed , Disp: , Rfl:     albuterol (PROVENTIL HFA,VENTOLIN HFA) 90 mcg/act inhaler, Inhale 1 puff daily as needed, Disp: , Rfl:     Ascorbic Acid (VITAMIN C) 1000 MG tablet, Take 1,000 mg by mouth daily, Disp: , Rfl:     aspirin (ECOTRIN LOW STRENGTH) 81 mg EC tablet, Take 81 mg by mouth daily, Disp: , Rfl:     Cholecalciferol (Vitamin D3) 25 MCG (1000 UT) CAPS, Take 1 tablet by mouth daily, Disp: , Rfl:     EPINEPHrine (EPIPEN) 0 3 mg/0 3 mL SOAJ, once, Disp: , Rfl: 0    hydrocortisone 1 % cream, Apply to rectal area daily  Preparation H  (Patient taking differently: 4 (four) times a day as needed Apply to rectal area daily  Preparation H ), Disp: 15 g, Rfl: 0    levonorgestrel (MIRENA) 20 MCG/24HR IUD, by Intrauterine route, Disp: , Rfl:     losartan-hydrochlorothiazide (HYZAAR) 100-12 5 MG per tablet, Take 1 tablet by mouth daily , Disp: , Rfl:     OXcarbazepine (TRILEPTAL) 300 mg tablet, TAKE 3 TABLETS BY MOUTH EVERY MORNING AND 4 TABLETS IN THE EVENING, Disp: 210 tablet, Rfl: 5    metFORMIN (GLUCOPHAGE) 500 mg tablet, Take 1 tablet by mouth 2 (two) times a day (Patient not taking: Reported on 6/13/2022), Disp: , Rfl:     I have reviewed the past medical, social and family history, current medications, allergies, vitals, review of systems and updated this information as appropriate today     Objective:    Vitals:  Blood pressure 138/80, pulse 76, height 5' 5" (1 651 m), weight 133 kg (292 lb 3 2 oz)      Physical Exam    Neurological Exam  GENERAL:  Well-developed well-nourished woman in no acute distress  HEENT/NECK: Head is atraumatic normocephalic, neck is supple  NEUROLOGIC:  Mental Status: Awake and alert without aphasia  Cranial Nerves: Extraocular movements are full  Face is symmetrical  Coordination:  Gait is stable            ROS:    Review of Systems   Constitutional: Negative  HENT: Negative  Eyes: Negative  Respiratory: Negative  Cardiovascular: Negative  Gastrointestinal: Negative  Endocrine: Negative  Genitourinary: Negative  Musculoskeletal: Negative  Skin: Negative  Allergic/Immunologic: Negative  Neurological:        Facial Pain    Hematological: Negative  Psychiatric/Behavioral: Negative

## 2022-12-13 ENCOUNTER — OFFICE VISIT (OUTPATIENT)
Dept: NEUROLOGY | Facility: CLINIC | Age: 62
End: 2022-12-13

## 2022-12-13 VITALS
SYSTOLIC BLOOD PRESSURE: 120 MMHG | HEIGHT: 65 IN | BODY MASS INDEX: 45.65 KG/M2 | HEART RATE: 56 BPM | DIASTOLIC BLOOD PRESSURE: 78 MMHG | WEIGHT: 274 LBS

## 2022-12-13 DIAGNOSIS — G50.0 TRIGEMINAL NEURALGIA: Primary | ICD-10-CM

## 2022-12-13 RX ORDER — AZITHROMYCIN 250 MG/1
TABLET, FILM COATED ORAL
COMMUNITY
Start: 2022-10-24

## 2022-12-13 RX ORDER — METHOCARBAMOL 500 MG/1
TABLET, FILM COATED ORAL
COMMUNITY
Start: 2022-11-10

## 2022-12-13 RX ORDER — OXCARBAZEPINE 300 MG/1
TABLET, FILM COATED ORAL
Qty: 210 TABLET | Refills: 5 | Status: SHIPPED | OUTPATIENT
Start: 2022-12-13

## 2022-12-13 RX ORDER — IBUPROFEN 600 MG/1
TABLET ORAL
COMMUNITY
Start: 2022-09-26

## 2022-12-13 RX ORDER — PREDNISONE 20 MG/1
TABLET ORAL
COMMUNITY
Start: 2022-10-24

## 2022-12-13 NOTE — PROGRESS NOTES
Alona Salcedo is a 65 y o  female returns in follow-up today history of facial pain    Assessment:  1  Trigeminal neuralgia        Plan:  Continue Trileptal  Follow-up 6 months    Discussion:  Karen Harman reports good control of her facial pain symptoms with Trileptal   We will continue current management and I will see her back in 6 months      Subjective:    HPI  Karen Harman returns in follow-up today  She reports that since her last she has been doing well from the standpoint of her facial pain symptoms  She remains on Trileptal and tolerates this well  She states she recently had dental work done removing her teeth and has been having some discomfort from that perspective  She reports that she is lost 20 pounds and she is pleased with this  She recently had an injection in her left shoulder for rotator cuff tendinitis and is noted improvement in range of motion      Past Medical History:   Diagnosis Date   • Anemia    • Arthritis    • Asthma    • Baker's cyst    • Chronic hip pain, left    • CTS (carpal tunnel syndrome)    • Diabetes mellitus (HCC)    • Facial pain    • Head injury     work injury- tripped into an elevator hitting her right temple on a metal hand railing   • History of bilateral knee replacement 2020   • Hypertension    • Knee pain    • Shoulder injury    • Trigeminal neuralgia        Family History:  Family History   Problem Relation Age of Onset   • Diabetes Mother    • Lung cancer Mother    • Diabetes Father    • Throat cancer Father    • Diabetes Sister    • Heart disease Sister    • Alzheimer's disease Maternal Grandmother        Past Surgical History:  Past Surgical History:   Procedure Laterality Date   •  SECTION     • HERNIA REPAIR     • KNEE SURGERY  2020    Total knee replacement both knees   • TOOTH EXTRACTION     • TUBAL LIGATION         Social History:   reports that she has quit smoking   She has never used smokeless tobacco  She reports that she does not currently use alcohol  She reports that she does not use drugs  Allergies:  [place] oil - food allergy, [place] [fish oil - food allergy], Shellfish-derived products - food allergy, Eggs or egg-derived products - food allergy, Iodine - food allergy, Lactose - food allergy, Other, Peanut-containing drug products - food allergy, and Amoxicillin      Current Outpatient Medications:   •  acetaminophen (TYLENOL) 325 mg tablet, Take 650 mg by mouth 2 (two) times a day as needed , Disp: , Rfl:   •  albuterol (PROVENTIL HFA,VENTOLIN HFA) 90 mcg/act inhaler, Inhale 1 puff daily as needed, Disp: , Rfl:   •  Ascorbic Acid (VITAMIN C) 1000 MG tablet, Take 1,000 mg by mouth daily, Disp: , Rfl:   •  aspirin (ECOTRIN LOW STRENGTH) 81 mg EC tablet, Take 81 mg by mouth daily, Disp: , Rfl:   •  azithromycin (ZITHROMAX) 250 mg tablet, , Disp: , Rfl:   •  Cholecalciferol (Vitamin D3) 25 MCG (1000 UT) CAPS, Take 1 tablet by mouth daily, Disp: , Rfl:   •  EPINEPHrine (EPIPEN) 0 3 mg/0 3 mL SOAJ, once, Disp: , Rfl: 0  •  hydrocortisone 1 % cream, Apply to rectal area daily  Preparation H  (Patient taking differently: 4 (four) times a day as needed Apply to rectal area daily   Preparation H ), Disp: 15 g, Rfl: 0  •  ibuprofen (MOTRIN) 600 mg tablet, , Disp: , Rfl:   •  levonorgestrel (MIRENA) 20 MCG/24HR IUD, by Intrauterine route, Disp: , Rfl:   •  losartan-hydrochlorothiazide (HYZAAR) 100-12 5 MG per tablet, Take 1 tablet by mouth daily , Disp: , Rfl:   •  methocarbamol (ROBAXIN) 500 mg tablet, , Disp: , Rfl:   •  OXcarbazepine (TRILEPTAL) 300 mg tablet, TAKE 3 TABLETS BY MOUTH EVERY MORNING AND 4 TABLETS IN THE EVENING, Disp: 210 tablet, Rfl: 5  •  predniSONE 20 mg tablet, , Disp: , Rfl:   •  metFORMIN (GLUCOPHAGE) 500 mg tablet, Take 1 tablet by mouth 2 (two) times a day (Patient not taking: Reported on 6/13/2022), Disp: , Rfl:     I have reviewed the past medical, social and family history, current medications, allergies, vitals, review of systems and updated this information as appropriate today     Objective:    Vitals:  Blood pressure (!) 120/78, pulse (!) 56, height 5' 5" (1 651 m), weight 124 kg (274 lb)  Physical Exam    Neurological Exam  GENERAL: Well-developed well-nourished woman in no acute distress  HEENT/NECK: Head is atraumatic normocephalic, neck is supplet  NEUROLOGIC:  Mental Status: Awake and alert without aphasia  Cranial Nerves: Extraocular movements are full  Face is symmetrical  Coordination: Gait is stable      ROS:    Review of Systems   Constitutional: Negative  Negative for appetite change and fever  HENT: Negative  Negative for hearing loss, tinnitus, trouble swallowing and voice change  Eyes: Negative  Negative for photophobia, pain and visual disturbance  Respiratory: Negative  Negative for shortness of breath  Cardiovascular: Negative  Negative for palpitations  Gastrointestinal: Negative  Negative for nausea and vomiting  Endocrine: Negative  Negative for cold intolerance  Genitourinary: Negative  Negative for dysuria, frequency and urgency  Musculoskeletal: Positive for back pain and neck pain  Negative for gait problem and myalgias  Patient states left shoulder pain   Skin: Negative  Negative for rash  Allergic/Immunologic: Negative  Neurological: Positive for numbness (left hand) and headaches (4 times a week)  Negative for dizziness, tremors, seizures, syncope, facial asymmetry, speech difficulty, weakness and light-headedness  Hematological: Negative  Does not bruise/bleed easily  Psychiatric/Behavioral: Negative  Negative for confusion, hallucinations and sleep disturbance

## 2023-02-20 ENCOUNTER — TELEPHONE (OUTPATIENT)
Dept: NEUROLOGY | Facility: CLINIC | Age: 63
End: 2023-02-20

## 2023-08-04 ENCOUNTER — TELEPHONE (OUTPATIENT)
Dept: NEUROLOGY | Facility: CLINIC | Age: 63
End: 2023-08-04

## 2023-08-04 NOTE — TELEPHONE ENCOUNTER
Called but voicemail box full. Sent text message and e-mail out to patient and provided appt time, date, and location.

## 2023-08-09 ENCOUNTER — OFFICE VISIT (OUTPATIENT)
Dept: NEUROLOGY | Facility: CLINIC | Age: 63
End: 2023-08-09
Payer: MEDICARE

## 2023-08-09 VITALS
DIASTOLIC BLOOD PRESSURE: 60 MMHG | WEIGHT: 274 LBS | SYSTOLIC BLOOD PRESSURE: 120 MMHG | BODY MASS INDEX: 45.65 KG/M2 | HEIGHT: 65 IN | HEART RATE: 79 BPM

## 2023-08-09 DIAGNOSIS — G50.0 TRIGEMINAL NEURALGIA: Primary | ICD-10-CM

## 2023-08-09 PROCEDURE — 99213 OFFICE O/P EST LOW 20 MIN: CPT | Performed by: PSYCHIATRY & NEUROLOGY

## 2023-08-09 RX ORDER — OXCARBAZEPINE 300 MG/1
TABLET, FILM COATED ORAL
Qty: 210 TABLET | Refills: 5 | Status: SHIPPED | OUTPATIENT
Start: 2023-08-09

## 2023-08-09 NOTE — PROGRESS NOTES
Debora June is a 65 y.o. female turns in follow-up to the history of trigeminal neuralgia    Assessment:  1. Trigeminal neuralgia        Plan:  Continue Trileptal  Follow-up 6 months    Discussion:  Lorenzo Mcnulty reports her trigeminal neuralgia pain is under good control with current management. Will continue current dose of Trileptal and follow-up in 6 months      Subjective:    HUNTER Mcnulty returns in follow-up today. She reports that since her last her trigeminal neuralgia pain symptoms are under good control. She states the only time she has pain is of a cold draft blows on her mouth. She remains on Trileptal and tolerates it well. She recently had some dental work done      Past Medical History:   Diagnosis Date   • Anemia    • Arthritis    • Asthma    • Baker's cyst    • Chronic hip pain, left    • CTS (carpal tunnel syndrome)    • Diabetes mellitus (HCC)    • Facial pain    • Head injury     work injury- tripped into an elevator hitting her right temple on a metal hand railing   • History of bilateral knee replacement 2020   • Hypertension    • Knee pain    • Shoulder injury    • Trigeminal neuralgia        Family History:  Family History   Problem Relation Age of Onset   • Diabetes Mother    • Lung cancer Mother    • Diabetes Father    • Throat cancer Father    • Diabetes Sister    • Heart disease Sister    • Alzheimer's disease Maternal Grandmother        Past Surgical History:  Past Surgical History:   Procedure Laterality Date   •  SECTION     • HERNIA REPAIR     • KNEE SURGERY  2020    Total knee replacement both knees   • TOOTH EXTRACTION     • TUBAL LIGATION         Social History:   reports that she has quit smoking. She has never used smokeless tobacco. She reports that she does not currently use alcohol. She reports that she does not use drugs.     Allergies:  [place] oil - food allergy, [place] [fish oil - food allergy], Shellfish-derived products - food allergy, Eggs or egg-derived products - food allergy, Iodine - food allergy, Lactose - food allergy, Other, Peanut-containing drug products - food allergy, and Amoxicillin      Current Outpatient Medications:   •  acetaminophen (TYLENOL) 325 mg tablet, Take 650 mg by mouth 2 (two) times a day as needed , Disp: , Rfl:   •  albuterol (PROVENTIL HFA,VENTOLIN HFA) 90 mcg/act inhaler, Inhale 1 puff daily as needed, Disp: , Rfl:   •  Ascorbic Acid (VITAMIN C) 1000 MG tablet, Take 1,000 mg by mouth daily, Disp: , Rfl:   •  aspirin (ECOTRIN LOW STRENGTH) 81 mg EC tablet, Take 81 mg by mouth daily, Disp: , Rfl:   •  Cholecalciferol (Vitamin D3) 25 MCG (1000 UT) CAPS, Take 1 tablet by mouth daily, Disp: , Rfl:   •  hydrocortisone 1 % cream, Apply to rectal area daily. Preparation H. (Patient taking differently: 4 (four) times a day as needed Apply to rectal area daily.  Preparation H.), Disp: 15 g, Rfl: 0  •  ibuprofen (MOTRIN) 600 mg tablet, , Disp: , Rfl:   •  losartan-hydrochlorothiazide (HYZAAR) 100-12.5 MG per tablet, Take 1 tablet by mouth daily , Disp: , Rfl:   •  OXcarbazepine (TRILEPTAL) 300 mg tablet, TAKE 3 TABLETS BY MOUTH EVERY MORNING AND 4 TABLETS IN THE EVENING, Disp: 210 tablet, Rfl: 5  •  predniSONE 20 mg tablet, , Disp: , Rfl:   •  azithromycin (ZITHROMAX) 250 mg tablet, , Disp: , Rfl:   •  EPINEPHrine (EPIPEN) 0.3 mg/0.3 mL SOAJ, once, Disp: , Rfl: 0  •  levonorgestrel (MIRENA) 20 MCG/24HR IUD, by Intrauterine route (Patient not taking: Reported on 8/9/2023), Disp: , Rfl:   •  metFORMIN (GLUCOPHAGE) 500 mg tablet, Take 1 tablet by mouth 2 (two) times a day (Patient not taking: Reported on 6/13/2022), Disp: , Rfl:   •  methocarbamol (ROBAXIN) 500 mg tablet, , Disp: , Rfl:     I have reviewed the past medical, social and family history, current medications, allergies, vitals, review of systems and updated this information as appropriate today     Objective:    Vitals:  Blood pressure 120/60, pulse 79, height 5' 5" (1.651 m), weight 124 kg (274 lb). Physical Exam    Neurological Exam  GENERAL: Well-developed well-nourished woman in no acute distress  HEENT/NECK: Head is atraumatic normocephalic, neck is supple  NEUROLOGIC:  Mental Status: Awake and alert without aphasia  Cranial Nerves: Extraocular movements are full. Face is symmetrical  Coordination: Gait is stable      ROS:    Review of Systems   Constitutional: Negative for appetite change, fatigue and fever. HENT: Negative. Negative for hearing loss, tinnitus, trouble swallowing and voice change. Eyes: Negative. Negative for photophobia, pain and visual disturbance. Respiratory: Negative. Negative for shortness of breath. Cardiovascular: Negative. Negative for palpitations. Gastrointestinal: Negative. Negative for nausea and vomiting. Endocrine: Negative. Negative for cold intolerance. Genitourinary: Negative. Negative for dysuria, frequency and urgency. Musculoskeletal: Negative for back pain, gait problem, myalgias and neck pain. Skin: Negative. Negative for rash. Allergic/Immunologic: Negative. Neurological: Negative. Negative for dizziness, tremors, seizures, syncope, facial asymmetry, speech difficulty, weakness, light-headedness, numbness and headaches. Hematological: Negative. Does not bruise/bleed easily. Psychiatric/Behavioral: Negative. Negative for confusion, hallucinations and sleep disturbance.

## 2023-08-21 ENCOUNTER — TELEPHONE (OUTPATIENT)
Dept: NEUROLOGY | Facility: CLINIC | Age: 63
End: 2023-08-21

## 2023-08-21 NOTE — TELEPHONE ENCOUNTER
Called and spoke to patient regarding scheduling 6 month F/U appt w/ Dr. Demond Briceño in [facility].  Scheduled for 02/28 @4PM.

## 2023-09-07 ENCOUNTER — HOSPITAL ENCOUNTER (EMERGENCY)
Facility: HOSPITAL | Age: 63
Discharge: HOME | End: 2023-09-07
Attending: EMERGENCY MEDICINE
Payer: MEDICARE

## 2023-09-07 ENCOUNTER — APPOINTMENT (EMERGENCY)
Dept: CT IMAGING | Facility: HOSPITAL | Age: 63
End: 2023-09-07
Payer: MEDICARE

## 2023-09-07 VITALS
TEMPERATURE: 97.6 F | RESPIRATION RATE: 18 BRPM | DIASTOLIC BLOOD PRESSURE: 72 MMHG | HEART RATE: 66 BPM | SYSTOLIC BLOOD PRESSURE: 179 MMHG | OXYGEN SATURATION: 98 %

## 2023-09-07 DIAGNOSIS — S21.209A BACK WOUND: ICD-10-CM

## 2023-09-07 DIAGNOSIS — K62.5 BRBPR (BRIGHT RED BLOOD PER RECTUM): Primary | ICD-10-CM

## 2023-09-07 LAB
ALBUMIN SERPL BCP-MCNC: 3.9 G/DL (ref 3.5–5)
ALP SERPL-CCNC: 117 U/L (ref 34–104)
ALT SERPL W P-5'-P-CCNC: 9 U/L (ref 7–52)
ANION GAP SERPL CALCULATED.3IONS-SCNC: 7 MMOL/L
APTT PPP: 35 SECONDS (ref 23–37)
AST SERPL W P-5'-P-CCNC: 10 U/L (ref 13–39)
BASOPHILS # BLD AUTO: 0.04 THOUSANDS/ÂΜL (ref 0–0.1)
BASOPHILS NFR BLD AUTO: 1 % (ref 0–1)
BILIRUB SERPL-MCNC: 0.23 MG/DL (ref 0.2–1)
BUN SERPL-MCNC: 24 MG/DL (ref 5–25)
CALCIUM SERPL-MCNC: 9.4 MG/DL (ref 8.4–10.2)
CHLORIDE SERPL-SCNC: 102 MMOL/L (ref 96–108)
CO2 SERPL-SCNC: 28 MMOL/L (ref 21–32)
CREAT SERPL-MCNC: 0.71 MG/DL (ref 0.6–1.3)
EOSINOPHIL # BLD AUTO: 0.32 THOUSAND/ÂΜL (ref 0–0.61)
EOSINOPHIL NFR BLD AUTO: 4 % (ref 0–6)
ERYTHROCYTE [DISTWIDTH] IN BLOOD BY AUTOMATED COUNT: 15.6 % (ref 11.6–15.1)
GFR SERPL CREATININE-BSD FRML MDRD: 92 ML/MIN/1.73SQ M
GLUCOSE SERPL-MCNC: 114 MG/DL (ref 65–140)
HCT VFR BLD AUTO: 36.2 % (ref 34.8–46.1)
HGB BLD-MCNC: 11.4 G/DL (ref 11.5–15.4)
IMM GRANULOCYTES # BLD AUTO: 0.06 THOUSAND/UL (ref 0–0.2)
IMM GRANULOCYTES NFR BLD AUTO: 1 % (ref 0–2)
INR PPP: 1.05 (ref 0.84–1.19)
LIPASE SERPL-CCNC: 15 U/L (ref 11–82)
LYMPHOCYTES # BLD AUTO: 2.2 THOUSANDS/ÂΜL (ref 0.6–4.47)
LYMPHOCYTES NFR BLD AUTO: 27 % (ref 14–44)
MCH RBC QN AUTO: 25.5 PG (ref 26.8–34.3)
MCHC RBC AUTO-ENTMCNC: 31.5 G/DL (ref 31.4–37.4)
MCV RBC AUTO: 81 FL (ref 82–98)
MONOCYTES # BLD AUTO: 0.62 THOUSAND/ÂΜL (ref 0.17–1.22)
MONOCYTES NFR BLD AUTO: 8 % (ref 4–12)
NEUTROPHILS # BLD AUTO: 4.92 THOUSANDS/ÂΜL (ref 1.85–7.62)
NEUTS SEG NFR BLD AUTO: 59 % (ref 43–75)
NRBC BLD AUTO-RTO: 0 /100 WBCS
PLATELET # BLD AUTO: 281 THOUSANDS/UL (ref 149–390)
PMV BLD AUTO: 9.5 FL (ref 8.9–12.7)
POTASSIUM SERPL-SCNC: 3.6 MMOL/L (ref 3.5–5.3)
PROT SERPL-MCNC: 8 G/DL (ref 6.4–8.4)
PROTHROMBIN TIME: 14.4 SECONDS (ref 11.6–14.5)
RBC # BLD AUTO: 4.47 MILLION/UL (ref 3.81–5.12)
SODIUM SERPL-SCNC: 137 MMOL/L (ref 135–147)
WBC # BLD AUTO: 8.16 THOUSAND/UL (ref 4.31–10.16)

## 2023-09-07 PROCEDURE — 83690 ASSAY OF LIPASE: CPT | Performed by: PHYSICIAN ASSISTANT

## 2023-09-07 PROCEDURE — 74176 CT ABD & PELVIS W/O CONTRAST: CPT

## 2023-09-07 PROCEDURE — 36415 COLL VENOUS BLD VENIPUNCTURE: CPT | Performed by: PHYSICIAN ASSISTANT

## 2023-09-07 PROCEDURE — 80053 COMPREHEN METABOLIC PANEL: CPT | Performed by: PHYSICIAN ASSISTANT

## 2023-09-07 PROCEDURE — 85610 PROTHROMBIN TIME: CPT | Performed by: PHYSICIAN ASSISTANT

## 2023-09-07 PROCEDURE — 85025 COMPLETE CBC W/AUTO DIFF WBC: CPT | Performed by: PHYSICIAN ASSISTANT

## 2023-09-07 PROCEDURE — 99284 EMERGENCY DEPT VISIT MOD MDM: CPT | Performed by: PHYSICIAN ASSISTANT

## 2023-09-07 PROCEDURE — 99285 EMERGENCY DEPT VISIT HI MDM: CPT

## 2023-09-07 PROCEDURE — 85730 THROMBOPLASTIN TIME PARTIAL: CPT | Performed by: PHYSICIAN ASSISTANT

## 2023-09-07 NOTE — ED PROVIDER NOTES
History  Chief Complaint   Patient presents with   • Rectal Bleeding     Pt reports large amount of bright red blood with bowel movements over the last few days. Reports slight abdominal pain, generalized "bubbling"     64yo female with a history of hypertension, diabetes, and asthma presenting for evaluation of rectal bleeding x 2 days. She reports bright red rectal bleeding with bowel movements. She has had 5 episodes total. Stools are loose and she denies any constipation or straining. She reports a large amount of bleeding after a bowel movement with clots. She also reports abdominal bloating and mild lightheadedness. She also has a wound on her back with drainage that she would like evaluated while she is in the ED. No syncope, chest pain, shortness of breath, or rectal pain. She had a colonoscopy about a year ago which was normal other than mild diverticulosis. No blood thinners. History provided by:  Patient   used: No        Prior to Admission Medications   Prescriptions Last Dose Informant Patient Reported? Taking? Ascorbic Acid (VITAMIN C) 1000 MG tablet  Self Yes No   Sig: Take 1,000 mg by mouth daily   Cholecalciferol (Vitamin D3) 25 MCG (1000 UT) CAPS   Yes No   Sig: Take 1 tablet by mouth daily   EPINEPHrine (EPIPEN) 0.3 mg/0.3 mL SOAJ   Yes No   Sig: once   OXcarbazepine (TRILEPTAL) 300 mg tablet   No No   Sig: TAKE 3 TABLETS BY MOUTH EVERY MORNING AND 4 TABLETS IN THE EVENING   acetaminophen (TYLENOL) 325 mg tablet  Self Yes No   Sig: Take 650 mg by mouth 2 (two) times a day as needed    albuterol (PROVENTIL HFA,VENTOLIN HFA) 90 mcg/act inhaler  Self Yes No   Sig: Inhale 1 puff daily as needed   aspirin (ECOTRIN LOW STRENGTH) 81 mg EC tablet   Yes No   Sig: Take 81 mg by mouth daily   azithromycin (ZITHROMAX) 250 mg tablet   Yes No   Patient not taking: Reported on 8/9/2023   hydrocortisone 1 % cream   No No   Sig: Apply to rectal area daily. Preparation H.    Patient taking differently: 4 (four) times a day as needed Apply to rectal area daily. Preparation H.   ibuprofen (MOTRIN) 600 mg tablet   Yes No   levonorgestrel (MIRENA) 20 MCG/24HR IUD   Yes No   Sig: by Intrauterine route   Patient not taking: Reported on 2023   losartan-hydrochlorothiazide (HYZAAR) 100-12.5 MG per tablet  Self Yes No   Sig: Take 1 tablet by mouth daily    metFORMIN (GLUCOPHAGE) 500 mg tablet   Yes No   Sig: Take 1 tablet by mouth 2 (two) times a day   Patient not taking: Reported on 2022   methocarbamol (ROBAXIN) 500 mg tablet   Yes No   predniSONE 20 mg tablet   Yes No      Facility-Administered Medications: None       Past Medical History:   Diagnosis Date   • Anemia    • Arthritis    • Asthma    • Baker's cyst    • Chronic hip pain, left    • CTS (carpal tunnel syndrome)    • Diabetes mellitus (HCC)    • Facial pain    • Head injury     work injury- tripped into an elevator hitting her right temple on a metal hand railing   • History of bilateral knee replacement 2020   • Hypertension    • Knee pain    • Shoulder injury    • Trigeminal neuralgia        Past Surgical History:   Procedure Laterality Date   •  SECTION     • HERNIA REPAIR     • KNEE SURGERY  2020    Total knee replacement both knees   • TOOTH EXTRACTION     • TUBAL LIGATION         Family History   Problem Relation Age of Onset   • Diabetes Mother    • Lung cancer Mother    • Diabetes Father    • Throat cancer Father    • Diabetes Sister    • Heart disease Sister    • Alzheimer's disease Maternal Grandmother      I have reviewed and agree with the history as documented.     E-Cigarette/Vaping   • E-Cigarette Use Never User      E-Cigarette/Vaping Substances   • Nicotine No    • THC No    • CBD No    • Flavoring No    • Other No    • Unknown No      Social History     Tobacco Use   • Smoking status: Former   • Smokeless tobacco: Never   • Tobacco comments:     quit 15 yrs ago   Vaping Use   • Vaping Use: Never used   Substance Use Topics   • Alcohol use: Not Currently   • Drug use: Never       Review of Systems   Constitutional: Negative for chills and fever. HENT: Negative for drooling and voice change. Eyes: Negative for discharge and redness. Respiratory: Negative for shortness of breath and stridor. Cardiovascular: Negative for chest pain and leg swelling. Gastrointestinal: Positive for abdominal pain and anal bleeding. Negative for nausea and vomiting. Musculoskeletal: Negative for neck pain and neck stiffness. Skin: Negative for color change and rash. Neurological: Negative for seizures and syncope. Psychiatric/Behavioral: Negative for confusion. The patient is not nervous/anxious. All other systems reviewed and are negative. Physical Exam  Physical Exam  Vitals and nursing note reviewed. Exam conducted with a chaperone present. Constitutional:       General: She is not in acute distress. Appearance: She is well-developed. She is not diaphoretic. HENT:      Head: Normocephalic and atraumatic. Right Ear: External ear normal.      Left Ear: External ear normal.      Nose: Nose normal.   Eyes:      General: No scleral icterus. Right eye: No discharge. Left eye: No discharge. Conjunctiva/sclera: Conjunctivae normal.   Cardiovascular:      Rate and Rhythm: Normal rate and regular rhythm. Heart sounds: Normal heart sounds. No murmur heard. Pulmonary:      Effort: Pulmonary effort is normal. No respiratory distress. Breath sounds: Normal breath sounds. No stridor. No wheezing or rales. Abdominal:      General: Bowel sounds are normal. There is no distension. Palpations: Abdomen is soft. Tenderness: There is abdominal tenderness in the left lower quadrant. There is no guarding. Genitourinary:     Rectum: Normal anal tone. Comments: Tiny non-thrombosed external hemorrhoid noted.  Stool is light brown on digital rectal exam. No nanci blood present. Musculoskeletal:         General: No deformity. Normal range of motion. Cervical back: Normal range of motion and neck supple. Back:    Lymphadenopathy:      Cervical: No cervical adenopathy. Skin:     General: Skin is warm and dry. Neurological:      Mental Status: She is alert. She is not disoriented. GCS: GCS eye subscore is 4. GCS verbal subscore is 5. GCS motor subscore is 6.    Psychiatric:         Behavior: Behavior normal.         Vital Signs  ED Triage Vitals   Temperature Pulse Respirations Blood Pressure SpO2   09/07/23 0921 09/07/23 0921 09/07/23 0921 09/07/23 0924 09/07/23 0921   97.6 °F (36.4 °C) 79 18 (!) 178/80 97 %      Temp Source Heart Rate Source Patient Position - Orthostatic VS BP Location FiO2 (%)   09/07/23 0921 09/07/23 0921 -- 09/07/23 0924 --   Temporal Monitor  Right arm       Pain Score       --                  Vitals:    09/07/23 0921 09/07/23 0924 09/07/23 1145   BP:  (!) 178/80 (!) 179/72   Pulse: 79  66         Visual Acuity      ED Medications  Medications - No data to display    Diagnostic Studies  Results Reviewed     Procedure Component Value Units Date/Time    Protime-INR [[id number]]  (Normal) Collected: 09/07/23 1139    Lab Status: Final result Specimen: Blood from Arm, Right Updated: 09/07/23 1212     Protime 14.4 seconds      INR 1.05    APTT [[id number]]  (Normal) Collected: 09/07/23 1139    Lab Status: Final result Specimen: Blood from Arm, Right Updated: 09/07/23 1212     PTT 35 seconds     Comprehensive metabolic panel [[id number]]  (Abnormal) Collected: 09/07/23 1139    Lab Status: Final result Specimen: Blood from Arm, Right Updated: 09/07/23 1206     Sodium 137 mmol/L      Potassium 3.6 mmol/L      Chloride 102 mmol/L      CO2 28 mmol/L      ANION GAP 7 mmol/L      BUN 24 mg/dL      Creatinine 0.71 mg/dL      Glucose 114 mg/dL      Calcium 9.4 mg/dL      AST 10 U/L      ALT 9 U/L      Alkaline Phosphatase 117 U/L      Total Protein 8.0 g/dL      Albumin 3.9 g/dL      Total Bilirubin 0.23 mg/dL      eGFR 92 ml/min/1.73sq m     Narrative:      National Kidney Disease Foundation guidelines for Chronic Kidney Disease (CKD):   •  Stage 1 with normal or high GFR (GFR > 90 mL/min/1.73 square meters)  •  Stage 2 Mild CKD (GFR = 60-89 mL/min/1.73 square meters)  •  Stage 3A Moderate CKD (GFR = 45-59 mL/min/1.73 square meters)  •  Stage 3B Moderate CKD (GFR = 30-44 mL/min/1.73 square meters)  •  Stage 4 Severe CKD (GFR = 15-29 mL/min/1.73 square meters)  •  Stage 5 End Stage CKD (GFR <15 mL/min/1.73 square meters)  Note: GFR calculation is accurate only with a steady state creatinine    Lipase [[id number]]  (Normal) Collected: 09/07/23 1139    Lab Status: Final result Specimen: Blood from Arm, Right Updated: 09/07/23 1206     Lipase 15 u/L     CBC and differential [[id number]]  (Abnormal) Collected: 09/07/23 1139    Lab Status: Final result Specimen: Blood from Arm, Right Updated: 09/07/23 1146     WBC 8.16 Thousand/uL      RBC 4.47 Million/uL      Hemoglobin 11.4 g/dL      Hematocrit 36.2 %      MCV 81 fL      MCH 25.5 pg      MCHC 31.5 g/dL      RDW 15.6 %      MPV 9.5 fL      Platelets 337 Thousands/uL      nRBC 0 /100 WBCs      Neutrophils Relative 59 %      Immat GRANS % 1 %      Lymphocytes Relative 27 %      Monocytes Relative 8 %      Eosinophils Relative 4 %      Basophils Relative 1 %      Neutrophils Absolute 4.92 Thousands/µL      Immature Grans Absolute 0.06 Thousand/uL      Lymphocytes Absolute 2.20 Thousands/µL      Monocytes Absolute 0.62 Thousand/µL      Eosinophils Absolute 0.32 Thousand/µL      Basophils Absolute 0.04 Thousands/µL                  CT abdomen pelvis wo contrast   Final Result by Mone Potter MD (09/07 1148)      Colonic diverticulosis with no evidence of acute diverticulitis. 4.8 cm right adnexal simple cyst reidentified.  This was characterized as a simple cyst on pelvic ultrasound at [facility] on 2/18/2022                  Workstation performed: [id number]                    Procedures  Procedures         ED Course  ED Course as of 09/07/23 1731   Thu Sep 07, 2023   1148 Hemoglobin(!): 11.4  11.9 in December 2022. SBIRT 20yo+    Flowsheet Row Most Recent Value   Initial Alcohol Screen: US AUDIT-C     1. How often do you have a drink containing alcohol? 0 Filed at: 09/07/2023 1147   2. How many drinks containing alcohol do you have on a typical day you are drinking? 0 Filed at: 09/07/2023 1147   3b. FEMALE Any Age, or MALE 65+: How often do you have 4 or more drinks on one occassion? 0 Filed at: 09/07/2023 1147   Audit-C Score 0 Filed at: 09/07/2023 1148   ROBBIN: How many times in the past year have you. .. Used an illegal drug or used a prescription medication for non-medical reasons? Never Filed at: 09/07/2023 1146                    Medical Decision Making  61yoF presenting for rectal bleeding x 2 days. C/o BRBPR with bowel movements. Described as large amounts of bleeding with clots. Also c/o abdominal bloating. No syncope or shortness of breath. She is afebrile and hemodynamically stable. She is well appearing in no distress. Stool is brown without nanci blood on rectal exam. No signs of peritonitis on abdominal exam.    Initial ED plan: Check CBC, CMP, coags, and CT abdomen. Final assessment: Hemoglobin is 11.4. Last hemoglobin 11.9 in December 2022. Remainder of labs unremarkable including normal renal function. CT abdomen shows diverticulosis without diverticulitis. There is also a right adnexal cyst which she was already aware of. No significant anemia. No indication for admission. Will prescribe mupirocin for her back wound. Advised close GI follow-up. ED return precautions discussed. Patient expressed understanding and is agreeable to plan. Patient discharged in stable condition.       Back wound: acute illness or injury  BRBPR (bright red blood per rectum): acute illness or injury  Amount and/or Complexity of Data Reviewed  Labs: ordered. Decision-making details documented in ED Course. Radiology: ordered. Risk  Prescription drug management. Disposition  Final diagnoses:   BRBPR (bright red blood per rectum)   Back wound     Time reflects when diagnosis was documented in both MDM as applicable and the Disposition within this note     Time User Action Codes Description Comment    9/7/2023 12:31 PM [address], [address] [K62.5] BRBPR (bright red blood per rectum)     9/7/2023 12:32 PM [address], [address] Back wound       ED Disposition     ED Disposition   Discharge    Condition   Stable    Date/Time   Thu Sep 7, 2023 12:31 PM    Comment   Melia Marquis discharge to home/self care.                Follow-up Information     Follow up With Specialties Details Why Contact Info Additional [address] [facility] Gastroenterology Schedule an appointment as soon as possible for a visit   [address] [place] [address] [facility], [address] [place] [address], [facility] [address], [facility], [place], [address] [facility] Emergency Department Emergency Medicine  If symptoms worsen [address] [facility] Emergency Department, [place], [place], [address]          Discharge Medication List as of 9/7/2023 12:33 PM      START taking these medications    Details   mupirocin (BACTROBAN) 2 % ointment Apply topically 3 (three) times a day for 7 days, Starting Thu 9/7/2023, Until Thu 9/14/2023, Normal         CONTINUE these medications which have NOT CHANGED    Details   acetaminophen (TYLENOL) 325 mg tablet Take 650 mg by mouth 2 (two) times a day as needed , Historical Med      albuterol (PROVENTIL HFA,VENTOLIN HFA) 90 mcg/act inhaler Inhale 1 puff daily as needed, Starting Mon 1/26/2015, Historical Med      Ascorbic Acid (VITAMIN C) 1000 MG tablet Take 1,000 mg by mouth daily, Historical Med      aspirin (ECOTRIN LOW STRENGTH) 81 mg EC tablet Take 81 mg by mouth daily, Historical Med      azithromycin (ZITHROMAX) 250 mg tablet Starting Mon 10/24/2022, Historical Med      Cholecalciferol (Vitamin D3) 25 MCG (1000 UT) CAPS Take 1 tablet by mouth daily, Historical Med      EPINEPHrine (EPIPEN) 0.3 mg/0.3 mL SOAJ once, Starting Mon 6/24/2019, Historical Med      hydrocortisone 1 % cream Apply to rectal area daily. Preparation H., Print      ibuprofen (MOTRIN) 600 mg tablet Starting Mon 9/26/2022, Historical Med      levonorgestrel (MIRENA) 20 MCG/24HR IUD by Intrauterine route, Historical Med      losartan-hydrochlorothiazide (HYZAAR) 100-12.5 MG per tablet Take 1 tablet by mouth daily , Starting Wed 1/3/2018, Historical Med      metFORMIN (GLUCOPHAGE) 500 mg tablet Take 1 tablet by mouth 2 (two) times a day, Starting Wed 10/2/2019, Historical Med      methocarbamol (ROBAXIN) 500 mg tablet Starting Thu 11/10/2022, Historical Med      OXcarbazepine (TRILEPTAL) 300 mg tablet TAKE 3 TABLETS BY MOUTH EVERY MORNING AND 4 TABLETS IN THE EVENING, Normal      predniSONE 20 mg tablet Starting Mon 10/24/2022, Historical Med             No discharge procedures on file.     PDMP Review     None          ED Provider  Electronically Signed by           Jackie Castillo PA-C  09/07/23 [id number]

## 2023-09-07 NOTE — DISCHARGE INSTRUCTIONS
Apply antibiotic ointment as prescribed. Please call today to schedule a follow-up appointment with gastroenterology. Return to the ER with any worsening symptoms, dizziness, passing out, shortness of breath.

## 2024-02-26 ENCOUNTER — TELEPHONE (OUTPATIENT)
Dept: NEUROLOGY | Facility: CLINIC | Age: 64
End: 2024-02-26

## 2024-02-26 NOTE — TELEPHONE ENCOUNTER
Called and LVM for patient regarding confirmation for upcoming appt w/ Dr. Sabillon. Provided patient w/ appt time, date, and location.

## 2024-02-28 ENCOUNTER — OFFICE VISIT (OUTPATIENT)
Dept: NEUROLOGY | Facility: CLINIC | Age: 64
End: 2024-02-28
Payer: MEDICARE

## 2024-02-28 VITALS
DIASTOLIC BLOOD PRESSURE: 80 MMHG | WEIGHT: 284.4 LBS | SYSTOLIC BLOOD PRESSURE: 140 MMHG | BODY MASS INDEX: 47.38 KG/M2 | HEIGHT: 65 IN

## 2024-02-28 DIAGNOSIS — G50.0 TRIGEMINAL NEURALGIA: Primary | ICD-10-CM

## 2024-02-28 PROCEDURE — 99213 OFFICE O/P EST LOW 20 MIN: CPT | Performed by: PSYCHIATRY & NEUROLOGY

## 2024-02-28 RX ORDER — OXCARBAZEPINE 300 MG/1
TABLET, FILM COATED ORAL
Qty: 210 TABLET | Refills: 5 | Status: SHIPPED | OUTPATIENT
Start: 2024-02-28

## 2024-02-28 NOTE — PROGRESS NOTES
Mera Mendez is a 63 y.o. female who returns in follow-up today with a history of trigeminal neuralgia    Assessment:  1. Trigeminal neuralgia        Plan:  Continue Trileptal  Follow-up 6 months    Discussion:  Mera's her facial pain symptoms remain under good control with Trileptal which she tolerates well.  Continue current management and follow-up in 6 months      Subjective:    HUNTER Tesfaye returns in follow-up today.  She reports that since her last she has been doing well.  She denies any issues of facial pain on Trileptal.  She tolerates her medications well.  She denies any new medical issues      Past Medical History:   Diagnosis Date    Anemia     Arthritis     Asthma     Baker's cyst     Chronic hip pain, left     CTS (carpal tunnel syndrome)     Diabetes mellitus (HCC)     Facial pain     Head injury     work injury- tripped into an elevator hitting her right temple on a metal hand railing    History of bilateral knee replacement 2020    Hypertension     Knee pain     Shoulder injury     Trigeminal neuralgia        Family History:  Family History   Problem Relation Age of Onset    Diabetes Mother     Lung cancer Mother     Diabetes Father     Throat cancer Father     Diabetes Sister     Heart disease Sister     Alzheimer's disease Maternal Grandmother        Past Surgical History:  Past Surgical History:   Procedure Laterality Date     SECTION      HERNIA REPAIR      KNEE SURGERY  2020    Total knee replacement both knees    TOOTH EXTRACTION      TUBAL LIGATION         Social History:   reports that she has quit smoking. She has never used smokeless tobacco. She reports that she does not currently use alcohol. She reports that she does not use drugs.    Allergies:  Rosemary oil - food allergy, Maramec [fish oil - food allergy], Shellfish-derived products - food allergy, Eggs or egg-derived products - food allergy, Iodine - food allergy, Lactose - food allergy, Other,  Peanut-containing drug products - food allergy, and Amoxicillin      Current Outpatient Medications:     acetaminophen (TYLENOL) 325 mg tablet, Take 650 mg by mouth 2 (two) times a day as needed , Disp: , Rfl:     albuterol (PROVENTIL HFA,VENTOLIN HFA) 90 mcg/act inhaler, Inhale 1 puff daily as needed, Disp: , Rfl:     Ascorbic Acid (VITAMIN C) 1000 MG tablet, Take 1,000 mg by mouth daily, Disp: , Rfl:     aspirin (ECOTRIN LOW STRENGTH) 81 mg EC tablet, Take 81 mg by mouth daily, Disp: , Rfl:     Cholecalciferol (Vitamin D3) 25 MCG (1000 UT) CAPS, Take 1 tablet by mouth daily, Disp: , Rfl:     ibuprofen (MOTRIN) 600 mg tablet, , Disp: , Rfl:     losartan-hydrochlorothiazide (HYZAAR) 100-12.5 MG per tablet, Take 1 tablet by mouth daily , Disp: , Rfl:     OXcarbazepine (TRILEPTAL) 300 mg tablet, TAKE 3 TABLETS BY MOUTH EVERY MORNING AND 4 TABLETS IN THE EVENING, Disp: 210 tablet, Rfl: 5    predniSONE 20 mg tablet, , Disp: , Rfl:     azithromycin (ZITHROMAX) 250 mg tablet, , Disp: , Rfl:     EPINEPHrine (EPIPEN) 0.3 mg/0.3 mL SOAJ, once (Patient not taking: Reported on 2/28/2024), Disp: , Rfl: 0    hydrocortisone 1 % cream, Apply to rectal area daily. Preparation H. (Patient taking differently: 4 (four) times a day as needed Apply to rectal area daily. Preparation H.), Disp: 15 g, Rfl: 0    levonorgestrel (MIRENA) 20 MCG/24HR IUD, by Intrauterine route (Patient not taking: Reported on 8/9/2023), Disp: , Rfl:     metFORMIN (GLUCOPHAGE) 500 mg tablet, Take 1 tablet by mouth 2 (two) times a day (Patient not taking: Reported on 6/13/2022), Disp: , Rfl:     methocarbamol (ROBAXIN) 500 mg tablet, , Disp: , Rfl:     mupirocin (BACTROBAN) 2 % ointment, Apply topically 3 (three) times a day for 7 days, Disp: 15 g, Rfl: 0    I have reviewed the past medical, social and family history, current medications, allergies, vitals, review of systems and updated this information as appropriate today     Objective:    Vitals:  Blood  "pressure 140/80, height 5' 5\" (1.651 m), weight 129 kg (284 lb 6.4 oz).    Physical Exam    Neurological Exam  GENERAL: Well-developed well-nourished woman in no acute distress  HEENT/NECK: Head is atraumatic normocephalic, neck is supple  NEUROLOGIC:  Mental Status: Awake and alert without aphasia  Cranial Nerves: Extraocular movements are full. Face is symmetrical  Coordination: Gait is stable      ROS:    Review of Systems   Constitutional:  Negative for appetite change, fatigue and fever.   HENT: Negative.  Negative for hearing loss, tinnitus, trouble swallowing and voice change.    Eyes: Negative.  Negative for photophobia, pain and visual disturbance.   Respiratory: Negative.  Negative for shortness of breath.    Cardiovascular: Negative.  Negative for palpitations.   Gastrointestinal: Negative.  Negative for nausea and vomiting.   Endocrine: Negative.  Negative for cold intolerance.   Genitourinary: Negative.  Negative for dysuria, frequency and urgency.   Musculoskeletal:  Negative for back pain, gait problem, myalgias, neck pain and neck stiffness.   Skin: Negative.  Negative for rash.   Allergic/Immunologic: Negative.    Neurological: Negative.  Negative for dizziness, tremors, seizures, syncope, facial asymmetry, speech difficulty, weakness, light-headedness, numbness and headaches.   Hematological: Negative.  Does not bruise/bleed easily.   Psychiatric/Behavioral: Negative.  Negative for confusion, hallucinations and sleep disturbance.                      "

## 2024-05-10 ENCOUNTER — TELEPHONE (OUTPATIENT)
Dept: NEUROLOGY | Facility: CLINIC | Age: 64
End: 2024-05-10

## 2024-07-10 ENCOUNTER — TELEPHONE (OUTPATIENT)
Dept: NEUROLOGY | Facility: CLINIC | Age: 64
End: 2024-07-10

## 2024-07-10 NOTE — TELEPHONE ENCOUNTER
Mera's appt with Dr. Sabillon on Aug 28 needed to be rescheduled with another provider due to circumstances. I have placed her on your schedule, if you are unable to take over her care please let me know and I can set her up with another provider.   Dx:Trigeminal Neuralgia  Thank you

## 2024-08-27 DIAGNOSIS — G50.0 TRIGEMINAL NEURALGIA: ICD-10-CM

## 2024-08-27 NOTE — TELEPHONE ENCOUNTER
- this is a prior Dr.James Sabillon patient who is now scheduled to see you in November. Are you agreeable to refilling?

## 2024-08-27 NOTE — TELEPHONE ENCOUNTER
Reason for call: Pharmacy they needed a new script resent!    [x] Refill   [] Prior Auth  [] Other:     Office:   [x] PCP/Provider -   [] Specialty/Provider -     Medication:     OXcarbazepine (TRILEPTAL) 300 mg tablet       Dose/Frequency:  TAKE 3 TABLETS BY MOUTH EVERY MORNING AND 4 TABLETS IN THE EVENING     Quantity:  210 tablet     Pharmacy: [facility] - [place], [place] [address] [phone]     Does the patient have enough for 3 days?   [] Yes   [x] No - Send as HP to POD

## 2024-08-28 RX ORDER — OXCARBAZEPINE 300 MG/1
TABLET, FILM COATED ORAL
Qty: 210 TABLET | Refills: 0 | Status: SHIPPED | OUTPATIENT
Start: 2024-08-28

## 2024-09-25 DIAGNOSIS — G50.0 TRIGEMINAL NEURALGIA: ICD-10-CM

## 2024-09-25 RX ORDER — OXCARBAZEPINE 300 MG/1
TABLET, FILM COATED ORAL
Qty: 210 TABLET | Refills: 0 | Status: SHIPPED | OUTPATIENT
Start: 2024-09-25

## 2024-11-01 ENCOUNTER — TELEPHONE (OUTPATIENT)
Dept: NEUROLOGY | Facility: CLINIC | Age: 64
End: 2024-11-01

## 2024-11-01 NOTE — TELEPHONE ENCOUNTER
I called patient and offer her an appointment with Dr. Nevarez in [place] office due to provider will be out of office. Patient stated that she prefer a female provider.

## 2024-11-13 ENCOUNTER — OFFICE VISIT (OUTPATIENT)
Dept: NEUROLOGY | Facility: CLINIC | Age: 64
End: 2024-11-13
Payer: MEDICARE

## 2024-11-13 VITALS
SYSTOLIC BLOOD PRESSURE: 126 MMHG | BODY MASS INDEX: 46.86 KG/M2 | RESPIRATION RATE: 98 BRPM | DIASTOLIC BLOOD PRESSURE: 78 MMHG | WEIGHT: 281.6 LBS | HEART RATE: 72 BPM | TEMPERATURE: 97.2 F

## 2024-11-13 DIAGNOSIS — G50.0 TRIGEMINAL NEURALGIA: Primary | ICD-10-CM

## 2024-11-13 PROCEDURE — 99214 OFFICE O/P EST MOD 30 MIN: CPT | Performed by: PSYCHIATRY & NEUROLOGY

## 2024-11-13 PROCEDURE — G2211 COMPLEX E/M VISIT ADD ON: HCPCS | Performed by: PSYCHIATRY & NEUROLOGY

## 2024-11-13 NOTE — PROGRESS NOTES
Ambulatory Visit  Name: Mera Mendez      : 1960      MRN: [id number]  Encounter Provider: Linnette Arcos MD  Encounter Date: 2024   Encounter department: [facility] [place]    Assessment & Plan  Trigeminal neuralgia  Patient is very pleasant right handed 64-year-old female with history of anemia, rheumatoid arthritis, asthma, carpal tunnel syndrome, concussion, COPD, and cyst, lumbar radiculopathy, hypertension, hidradenitis, obesity, osteoarthritis, hyperlipidemia, hypertrophic cardiomyopathy, type 2 diabetes thyroid nodule and vitamin D deficiency who presents for trigeminal neuralgia follow-up.     Trigeminal neuralgia present since  when she had a tooth extraction from her right upper jaw.  Patient was placed under anesthesia for the procedure.  When she woke up she had an intense pain.  Patient was placed on oxcarbazepine which improved the pain.    Today patient reports that she recently got all of her teeth removed and the pain resolved.  She weaned herself off of the oxcarbazepine.  She states that she is only taking 300 mg in the morning.  She is completely pain-free.    Okay to discontinue oxcarbazepine now that she is pain-free.  Will see her again in 6 months just to ensure stability.           History of Present Illness   HPI    Patient is very pleasant right handed 64-year-old female with history of anemia, rheumatoid arthritis, asthma, carpal tunnel syndrome, concussion, COPD, and cyst, lumbar radiculopathy, hypertension, hidradenitis, obesity, osteoarthritis, hyperlipidemia, hypertrophic cardiomyopathy, type 2 diabetes thyroid nodule and vitamin D deficiency who presents for trigeminal neuralgia follow-up.  Patient previously followed by Dr. Jf Sabillon.  She was last seen by him on 2024.    Patient follows with neurology for trigeminal neuralgia. In  she had a tooth extraction from her right upper jaw. She was put under anesthesia  for the procedure. When she woke up with an intense stabbing pain. She was started on oxcarbazepine which improved the pain.     Interval history:  Recently got all of her teeth removed, the pain went away. She recently weaned herself off oxcarbazepine.  She is completely pain free.   Currently taking 300 mg  in the morning          Review of Systems   Constitutional:  Negative for appetite change, fatigue and fever.   HENT: Negative.  Negative for hearing loss, tinnitus, trouble swallowing and voice change.    Eyes: Negative.  Negative for photophobia, pain and visual disturbance.   Respiratory: Negative.  Negative for shortness of breath.    Cardiovascular: Negative.  Negative for palpitations.   Gastrointestinal: Negative.  Negative for nausea and vomiting.   Endocrine: Negative.  Negative for cold intolerance.   Genitourinary: Negative.  Negative for dysuria, frequency and urgency.   Musculoskeletal:  Negative for back pain, gait problem, myalgias, neck pain and neck stiffness.   Skin: Negative.  Negative for rash.   Allergic/Immunologic: Negative.    Neurological: Negative.  Negative for dizziness, tremors, seizures, syncope, facial asymmetry, speech difficulty, weakness, light-headedness, numbness and headaches.   Hematological: Negative.  Does not bruise/bleed easily.   Psychiatric/Behavioral: Negative.  Negative for confusion, hallucinations and sleep disturbance.    All other systems reviewed and are negative.    I have personally reviewed the MA's review of systems and made changes as necessary.      Objective     LMP  (LMP Unknown)     Physical Exam  Neurologic Exam

## 2024-12-07 NOTE — ASSESSMENT & PLAN NOTE
Patient is very pleasant right handed 64-year-old female with history of anemia, rheumatoid arthritis, asthma, carpal tunnel syndrome, concussion, COPD, and cyst, lumbar radiculopathy, hypertension, hidradenitis, obesity, osteoarthritis, hyperlipidemia, hypertrophic cardiomyopathy, type 2 diabetes thyroid nodule and vitamin D deficiency who presents for trigeminal neuralgia follow-up.     Trigeminal neuralgia present since 2016 when she had a tooth extraction from her right upper jaw.  Patient was placed under anesthesia for the procedure.  When she woke up she had an intense pain.  Patient was placed on oxcarbazepine which improved the pain.    Today patient reports that she recently got all of her teeth removed and the pain resolved.  She weaned herself off of the oxcarbazepine.  She states that she is only taking 300 mg in the morning.  She is completely pain-free.    Okay to discontinue oxcarbazepine now that she is pain-free.  Will see her again in 6 months just to ensure stability.

## 2025-05-04 ENCOUNTER — HOSPITAL ENCOUNTER (EMERGENCY)
Facility: HOSPITAL | Age: 65
Discharge: HOME | End: 2025-05-04
Attending: EMERGENCY MEDICINE
Payer: MEDICARE

## 2025-05-04 ENCOUNTER — APPOINTMENT (EMERGENCY)
Dept: RADIOLOGY | Facility: HOSPITAL | Age: 65
End: 2025-05-04
Payer: MEDICARE

## 2025-05-04 ENCOUNTER — TELEPHONE (OUTPATIENT)
Dept: EMERGENCY DEPT | Facility: HOSPITAL | Age: 65
End: 2025-05-04

## 2025-05-04 VITALS
DIASTOLIC BLOOD PRESSURE: 96 MMHG | TEMPERATURE: 98.1 F | HEART RATE: 77 BPM | OXYGEN SATURATION: 97 % | RESPIRATION RATE: 20 BRPM | SYSTOLIC BLOOD PRESSURE: 137 MMHG

## 2025-05-04 DIAGNOSIS — R93.6: Primary | ICD-10-CM

## 2025-05-04 DIAGNOSIS — S83.91XA RIGHT KNEE SPRAIN: ICD-10-CM
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
## 2025-05-04 DIAGNOSIS — M25.571 RIGHT ANKLE PAIN: ICD-10-CM

## 2025-05-04 DIAGNOSIS — M25.511 RIGHT SHOULDER PAIN: ICD-10-CM

## 2025-05-04 PROCEDURE — 99284 EMERGENCY DEPT VISIT MOD MDM: CPT

## 2025-05-04 PROCEDURE — 73610 X-RAY EXAM OF ANKLE: CPT

## 2025-05-04 PROCEDURE — 73630 X-RAY EXAM OF FOOT: CPT

## 2025-05-04 PROCEDURE — 73030 X-RAY EXAM OF SHOULDER: CPT

## 2025-05-04 PROCEDURE — 73562 X-RAY EXAM OF KNEE 3: CPT

## 2025-05-04 RX ORDER — ACETAMINOPHEN 325 MG/1
975 TABLET ORAL ONCE
Status: COMPLETED | OUTPATIENT
Start: 2025-05-04 | End: 2025-05-04

## 2025-05-04 RX ADMIN — ACETAMINOPHEN 975 MG: 325 TABLET, FILM COATED ORAL at 06:38

## 2025-05-04 NOTE — TELEPHONE ENCOUNTER
Called patient regarding test results, subtle questionable fracture of the lateral malleolus of the right ankle, she has been weightbearing on this, she is having no pain in the lateral aspect.  Advised patient I will place her into a splint and to return for splint placement and orthopedic follow-up outpatient.  Referral placed.

## 2025-05-04 NOTE — ED PROVIDER NOTES
Time reflects when diagnosis was documented in both MDM as applicable and the Disposition within this note       Time User Action Codes Description Comment    5/4/2025  8:03 AM Paola Tobi Add [W19.XXXA] Fall, initial encounter     5/4/2025  8:03 AM Paola Tobi Add [S83.91XA] Right knee sprain     5/4/2025  8:03 AM Luz Guevaray Add [M25.511] Right shoulder pain     5/4/2025  8:03 AM Luz Guevaray Add [M25.571] Right ankle pain           ED Disposition       ED Disposition   Discharge    Condition   Stable    Date/Time   Sun May 4, 2025  8:03 AM    Comment   Mera Vanessa discharge to home/self care.                   Assessment & Plan       Medical Decision Making  64-year-old very pleasant female presents to the emergency room accompanied by significant other for evaluation of a fall down 2 stairs directly onto her right knee as well as her right shoulder against the wall, this occurred 3 days ago.  No head injury or loss of consciousness or vomiting or any neurological complaints since.  The fall was witnessed by significant other at bedside who gives history as well and reports that she has been ambulatory since then and bearing weight in the bilateral lower extremities.  She also reports that she has had no head strike during this event nor having any numbness, tingling, or any paresthesias.  She has been amatory since then however has had pain in the right knee.  She states that this is where the majority of the pain is, have some right shoulder pain as well from hitting this against the wall but has had full range of motion of it since.  She is in no acute distress.  Well-appearing on examination.  States that the pain was a throbbing pain in the knee and she prompted ED visit because the pain was still there after 3 days despite supportive measures.  X-ray imaging obtained of the knee, tib-fib, ankle, as well as the shoulder, on my independent interpretation negative for any acute fracture or hardware  disruption.  Advised her to follow-up with family care provider in the next few days.  Was given Tylenol here in the ED and she had significant relief of symptoms.  An Ace bandage was applied for comfort as well as to help if any edema does form.  Advised her safety precautions when ambulating and to walk with assistance as well as utilize handrails when going upstairs.  She is ambulatory on the ED well-appearing nontoxic in no acute distress and pain well-controlled.    Amount and/or Complexity of Data Reviewed  Radiology: ordered.    Risk  OTC drugs.      Xray reads pending at time of dc, advised pt will receive call back if any read discrepancies occur.         ED Course as of 05/04/25 1141   Sun May 04, 2025   0802 Patient reevaluated, feeling substantially better, advised her to take 1 does milligrams of acetaminophen every 8 hours as needed for pain control with food.  She states that after taking Tylenol here she feels much better and is able to rest.  She still ambulatory with normal gait.  Moving all 4 extremity spontaneously.  X-ray imaging on my independent interpretation of the shoulder, ankle, foot, and knee negative for acute fracture or dislocation.  Hardware appears intact.  Advised to follow-up with her orthopedics outpatient.  ED return precaution discussed with her at bedside she verbalized understanding.       Medications   acetaminophen (TYLENOL) tablet 975 mg (975 mg Oral Given 5/4/25 0638)       ED Risk Strat Scores                    No data recorded        SBIRT 20yo+      Flowsheet Row Most Recent Value   Initial Alcohol Screen: US AUDIT-C     1. How often do you have a drink containing alcohol? 0 Filed at: 05/04/2025 0642   2. How many drinks containing alcohol do you have on a typical day you are drinking?  0 Filed at: 05/04/2025 0642   3b. FEMALE Any Age, or MALE 65+: How often do you have 4 or more drinks on one occassion? 0 Filed at: 05/04/2025 0642   Audit-C Score 0 Filed at:  2025 0642   ROBBIN: How many times in the past year have you...    Used an illegal drug or used a prescription medication for non-medical reasons? Never Filed at: 2025 0642                            History of Present Illness       Chief Complaint   Patient presents with    Fall     Patient arrives to the ER from home with complaints of fall 3 days ago. + R. shoulder and b/l knee pain. +PMS to all extremities. Pt ambulated to room 26.        Past Medical History:   Diagnosis Date    Anemia     Arthritis     Asthma     Baker's cyst     Chronic hip pain, left     CTS (carpal tunnel syndrome)     Diabetes mellitus (HCC)     Facial pain     Head injury     work injury- tripped into an elevator hitting her right temple on a metal hand railing    History of bilateral knee replacement 2020    Hypertension     Knee pain     Shoulder injury     Trigeminal neuralgia       Past Surgical History:   Procedure Laterality Date     SECTION      HERNIA REPAIR      KNEE SURGERY  2020    Total knee replacement both knees    TOOTH EXTRACTION      TUBAL LIGATION        Family History   Problem Relation Age of Onset    Diabetes Mother     Lung cancer Mother     Diabetes Father     Throat cancer Father     Diabetes Sister     Heart disease Sister     Alzheimer's disease Maternal Grandmother       Social History     Tobacco Use    Smoking status: Former    Smokeless tobacco: Never    Tobacco comments:     quit 15 yrs ago   Vaping Use    Vaping status: Never Used   Substance Use Topics    Alcohol use: Not Currently    Drug use: Never      E-Cigarette/Vaping    E-Cigarette Use Never User       E-Cigarette/Vaping Substances    Nicotine No     THC No     CBD No     Flavoring No     Other No     Unknown No       I have reviewed and agree with the history as documented.       History provided by:  Patient, medical records and spouse   used: No    Fall  Mechanism of injury comment:  Down 2 stairs 3  days ago  Injury location: right knee and right shoulder.  Time since incident:  3 days  Arrived directly from scene: no    Suspicion of alcohol use: no    Suspicion of drug use: no    Prior to arrival data:     Bystander interventions:  None  Associated symptoms: no abdominal pain, no back pain, no blindness, no chest pain, no difficulty breathing, no headaches, no hearing loss, no loss of consciousness, no nausea, no neck pain, no seizures and no vomiting        Review of Systems   Constitutional:  Negative for chills and fever.   HENT:  Negative for hearing loss.    Eyes:  Negative for blindness, photophobia and visual disturbance.   Respiratory:  Negative for chest tightness and shortness of breath.    Cardiovascular:  Negative for chest pain.   Gastrointestinal:  Negative for abdominal pain, nausea and vomiting.   Musculoskeletal:  Positive for arthralgias. Negative for back pain and neck pain.   Neurological:  Negative for seizures, loss of consciousness and headaches.   All other systems reviewed and are negative.          Objective       ED Triage Vitals   Temperature Pulse Blood Pressure Respirations SpO2 Patient Position - Orthostatic VS   05/04/25 0615 05/04/25 0615 05/04/25 0615 05/04/25 0615 05/04/25 0615 --   98.1 °F (36.7 °C) 77 137/96 20 97 %       Temp src Heart Rate Source BP Location FiO2 (%) Pain Score    -- -- -- -- 05/04/25 0638        9      Vitals      Date and Time Temp Pulse SpO2 Resp BP Pain Score FACES Pain Rating User   05/04/25 0638 -- -- -- -- -- 9 -- ML   05/04/25 0615 98.1 °F (36.7 °C) 77 97 % 20 137/96 -- -- KB            Physical Exam  Vitals and nursing note reviewed.   Constitutional:       General: She is not in acute distress.     Appearance: Normal appearance. She is well-developed. She is not ill-appearing or toxic-appearing.   HENT:      Head: Normocephalic and atraumatic.      Right Ear: External ear normal.      Left Ear: External ear normal.      Nose: Nose normal. No  congestion or rhinorrhea.   Eyes:      General: No scleral icterus.        Right eye: No discharge.         Left eye: No discharge.      Extraocular Movements: Extraocular movements intact.      Conjunctiva/sclera: Conjunctivae normal.      Pupils: Pupils are equal, round, and reactive to light.   Cardiovascular:      Rate and Rhythm: Normal rate and regular rhythm.      Heart sounds: Normal heart sounds. No murmur heard.     No friction rub. No gallop.   Pulmonary:      Effort: Pulmonary effort is normal. No respiratory distress.      Breath sounds: Normal breath sounds. No stridor. No wheezing, rhonchi or rales.   Chest:      Chest wall: No tenderness.   Abdominal:      General: Abdomen is flat. There is no distension.      Palpations: Abdomen is soft.      Tenderness: There is no abdominal tenderness. There is no right CVA tenderness, left CVA tenderness or guarding.   Musculoskeletal:         General: Tenderness present. No swelling, deformity or signs of injury.        Arms:       Cervical back: Normal range of motion and neck supple. No rigidity or tenderness.      Right lower leg: No edema.      Left lower leg: No edema.        Legs:       Comments: Tenderness to palpation, no deformities, no laxity, full range of motion preserved, no joint effusion, erythema, warmth, or lacerations.  Neuro vas intact in all 4 extremities, normal sensation and motor function of all 4 extremities.   Lymphadenopathy:      Cervical: No cervical adenopathy.   Skin:     General: Skin is warm and dry.      Capillary Refill: Capillary refill takes less than 2 seconds.   Neurological:      General: No focal deficit present.      Mental Status: She is alert and oriented to person, place, and time.   Psychiatric:         Mood and Affect: Mood normal.         Results Reviewed       None            XR shoulder 2+ views RIGHT   Final Interpretation by Ismael Lizama MD (05/04 1105)      Arthritic changes. No fracture or  dislocation seen         Computerized Assisted Algorithm (CAA) may have been used to analyze all applicable images.         Workstation performed: [id number]         XR ankle 3+ views RIGHT   Final Interpretation by Ismael Lizama MD (05/04 1103)      Questionable subtle fracture at the tip of the lateral malleolus      The study was marked in EPIC for immediate notification.         Computerized Assisted Algorithm (CAA) may have been used to analyze all applicable images.               Workstation performed: [id number]         XR foot 3+ views RIGHT   Final Interpretation by Ismael Lizama MD (05/04 1102)      No acute osseous abnormality.         Computerized Assisted Algorithm (CAA) may have been used to analyze all applicable images.         Workstation performed: [id number]         XR knee 3 vw right non injury   Final Interpretation by Ismael Lizama MD (05/04 1101)      No fracture or dislocation. Small joint effusion. Knee prosthesis is grossly satisfactory within the field of view         Computerized Assisted Algorithm (CAA) may have been used to analyze all applicable images.         Workstation performed: [id number]             Procedures  ACE WRAP APPLIED  WBAT  Ortho to follow up (her ortho from her knee replacement)    ED Medication and Procedure Management   Prior to Admission Medications   Prescriptions Last Dose Informant Patient Reported? Taking?   Ascorbic Acid (VITAMIN C) 1000 MG tablet  Self Yes No   Sig: Take 1,000 mg by mouth daily   Cholecalciferol (Vitamin D3) 25 MCG (1000 UT) CAPS  Self Yes No   Sig: Take 1 tablet by mouth daily   EPINEPHrine (EPIPEN) 0.3 mg/0.3 mL SOAJ  Self Yes No   Sig: once   Patient not taking: Reported on 2/28/2024   acetaminophen (TYLENOL) 325 mg tablet  Self Yes No   Sig: Take 650 mg by mouth 2 (two) times a day as needed    albuterol (PROVENTIL HFA,VENTOLIN HFA) 90 mcg/act inhaler  Self Yes No   Sig: Inhale 1 puff daily as needed    aspirin (ECOTRIN LOW STRENGTH) 81 mg EC tablet  Self Yes No   Sig: Take 81 mg by mouth daily   hydrocortisone 1 % cream  Self No No   Sig: Apply to rectal area daily.  Preparation H.   Patient taking differently: 4 (four) times a day as needed Apply to rectal area daily.  Preparation H.   ibuprofen (MOTRIN) 600 mg tablet  Self Yes No   losartan-hydrochlorothiazide (HYZAAR) 100-12.5 MG per tablet  Self Yes No   Sig: Take 1 tablet by mouth daily    mupirocin (BACTROBAN) 2 % ointment  Self No No   Sig: Apply topically 3 (three) times a day for 7 days   predniSONE 20 mg tablet  Self Yes No      Facility-Administered Medications: None     Discharge Medication List as of 5/4/2025  8:04 AM        CONTINUE these medications which have NOT CHANGED    Details   acetaminophen (TYLENOL) 325 mg tablet Take 650 mg by mouth 2 (two) times a day as needed , Historical Med      albuterol (PROVENTIL HFA,VENTOLIN HFA) 90 mcg/act inhaler Inhale 1 puff daily as needed, Starting Mon 1/26/2015, Historical Med      Ascorbic Acid (VITAMIN C) 1000 MG tablet Take 1,000 mg by mouth daily, Historical Med      aspirin (ECOTRIN LOW STRENGTH) 81 mg EC tablet Take 81 mg by mouth daily, Historical Med      Cholecalciferol (Vitamin D3) 25 MCG (1000 UT) CAPS Take 1 tablet by mouth daily, Historical Med      EPINEPHrine (EPIPEN) 0.3 mg/0.3 mL SOAJ once, Starting Mon 6/24/2019, Historical Med      hydrocortisone 1 % cream Apply to rectal area daily.  Preparation H., Print      ibuprofen (MOTRIN) 600 mg tablet Starting Mon 9/26/2022, Historical Med      losartan-hydrochlorothiazide (HYZAAR) 100-12.5 MG per tablet Take 1 tablet by mouth daily , Starting Wed 1/3/2018, Historical Med      mupirocin (BACTROBAN) 2 % ointment Apply topically 3 (three) times a day for 7 days, Starting u 9/7/2023, Until Wed 11/13/2024, Normal      predniSONE 20 mg tablet Starting Mon 10/24/2022, Historical Med           No discharge procedures on file.  ED SEPSIS  DOCUMENTATION   Time reflects when diagnosis was documented in both MDM as applicable and the Disposition within this note       Time User Action Codes Description Comment    5/4/2025  8:03 AM Tobi Guevara [W19.XXXA] Fall, initial encounter     5/4/2025  8:03 AM Tobi Guevara [S83.91XA] Right knee sprain     5/4/2025  8:03 AM Tobi Guevara [M25.511] Right shoulder pain     5/4/2025  8:03 AM Tobi Guevara [M25.571] Right ankle pain                  Tobi Guevara PA-C  05/04/25 1141

## 2025-05-04 NOTE — DISCHARGE INSTRUCTIONS
Take 1000mg of acetaminophen (tylenol) every 8 hours as needed for pain with food and water.  Call your orthopedic (bone doctor) for follow up on your knee.

## 2025-05-05 ENCOUNTER — TELEPHONE (OUTPATIENT)
Dept: NEUROLOGY | Facility: CLINIC | Age: 65
End: 2025-05-05

## 2025-05-05 NOTE — TELEPHONE ENCOUNTER
Tried calling PT to confirm appointment and mailbox was full. I am going to send a [employer] message about the appointment reminder.

## 2025-05-08 ENCOUNTER — OFFICE VISIT (OUTPATIENT)
Dept: OBGYN CLINIC | Facility: CLINIC | Age: 65
End: 2025-05-08
Attending: PHYSICIAN ASSISTANT
Payer: MEDICARE

## 2025-05-08 VITALS — WEIGHT: 280 LBS | BODY MASS INDEX: 46.65 KG/M2 | HEIGHT: 65 IN

## 2025-05-08 DIAGNOSIS — M67.873 ACHILLES TENDINOSIS OF RIGHT ANKLE: ICD-10-CM

## 2025-05-08 DIAGNOSIS — M25.671 ANKLE STIFFNESS, RIGHT: ICD-10-CM

## 2025-05-08 DIAGNOSIS — M76.822 POSTERIOR TIBIAL TENDON DYSFUNCTION (PTTD) OF BOTH LOWER EXTREMITIES: ICD-10-CM

## 2025-05-08 DIAGNOSIS — M25.371 ANKLE INSTABILITY, RIGHT: ICD-10-CM

## 2025-05-08 DIAGNOSIS — S93.421A SPRAIN OF DELTOID LIGAMENT OF RIGHT ANKLE, INITIAL ENCOUNTER: ICD-10-CM

## 2025-05-08 DIAGNOSIS — S93.491A SPRAIN OF ANTERIOR TALOFIBULAR LIGAMENT OF RIGHT ANKLE, INITIAL ENCOUNTER: ICD-10-CM

## 2025-05-08 DIAGNOSIS — M76.821 POSTERIOR TIBIAL TENDON DYSFUNCTION (PTTD) OF BOTH LOWER EXTREMITIES: ICD-10-CM

## 2025-05-08 DIAGNOSIS — M19.071 ARTHRITIS OF RIGHT ANKLE: Primary | ICD-10-CM

## 2025-05-08 PROCEDURE — 20605 DRAIN/INJ JOINT/BURSA W/O US: CPT | Performed by: FAMILY MEDICINE

## 2025-05-08 PROCEDURE — 99204 OFFICE O/P NEW MOD 45 MIN: CPT | Performed by: FAMILY MEDICINE

## 2025-05-08 RX ORDER — MELOXICAM 15 MG/1
15 TABLET ORAL DAILY
Qty: 30 TABLET | Refills: 1 | Status: SHIPPED | OUTPATIENT
Start: 2025-05-08

## 2025-05-08 RX ORDER — BUPIVACAINE HYDROCHLORIDE 2.5 MG/ML
2 INJECTION, SOLUTION INFILTRATION; PERINEURAL
Status: COMPLETED | OUTPATIENT
Start: 2025-05-08 | End: 2025-05-08

## 2025-05-08 RX ORDER — METHYLPREDNISOLONE ACETATE 40 MG/ML
1 INJECTION, SUSPENSION INTRA-ARTICULAR; INTRALESIONAL; INTRAMUSCULAR; SOFT TISSUE
Status: COMPLETED | OUTPATIENT
Start: 2025-05-08 | End: 2025-05-08

## 2025-05-08 RX ADMIN — METHYLPREDNISOLONE ACETATE 1 ML: 40 INJECTION, SUSPENSION INTRA-ARTICULAR; INTRALESIONAL; INTRAMUSCULAR; SOFT TISSUE at 08:30

## 2025-05-08 RX ADMIN — BUPIVACAINE HYDROCHLORIDE 2 ML: 2.5 INJECTION, SOLUTION INFILTRATION; PERINEURAL at 08:30

## 2025-05-08 NOTE — ASSESSMENT & PLAN NOTE
64-year-old female with acute worsening of chronic right ankle pain from injury on 5/1/2025.  X-rays of the right ankle noted for cortical irregularity at the tip lateral malleolus with degenerative changes of the ankle joint.  Clinical impression is that she has symptoms from combination of aggravation of arthritis, acute on chronic sprains, and tendinopathy.  I discussed treatment regimen of steroid injection, anti-inflammatory, supplements, and formal therapy.  Surgery not warranted.  I administered mixture of 2 cc 0.25% bupivacaine1 cc Depo-Medrol to the right ankle joint without complication.  Take meloxicam 15 mg once daily with food for 30 days and not to take ibuprofen or Aleve, but may take Tylenol.  Apply topical diclofenac gel 3 times daily for the next 10 days.  May do Epsom salt soaks.  Take turmeric and tart cherry supplements.  May continue cam boot for another 2 weeks.  Start formal therapy as soon as possible and do home exercises as directed.  I will refer to physical therapy orthotist for custom orthotics to help improve function.  She will follow-up in 4 weeks for reevaluation.  Orders:    Ambulatory Referral to Physical Therapy; Future

## 2025-05-08 NOTE — PATIENT INSTRUCTIONS
Over the counter vitamins:    - Turmeric vitamin at least 1000 mg daily    - Tart cherry vitamin at least 1000 mg daily    Over the counter diclofenac gel/voltaren  - 3 times daily      Rx: Meloxicam 15 mg  - once daily  - eat first  - EVERYDAY  - 30 days  - no ibuprofen  - no aleve  - tylenol is ok    Epsom Salt Soaks    Referral for orthotics    Physical Therapy and home exercises

## 2025-05-08 NOTE — PROGRESS NOTES
Name: Mera Mendez      : 1960      MRN: [id number]  Encounter Provider: Hilary Lewis DO  Encounter Date: 2025   Encounter department: [facility] [place]  :  Assessment & Plan  Arthritis of right ankle  64-year-old female with acute worsening of chronic right ankle pain from injury on 2025.  X-rays of the right ankle noted for cortical irregularity at the tip lateral malleolus with degenerative changes of the ankle joint.  Clinical impression is that she has symptoms from combination of aggravation of arthritis, acute on chronic sprains, and tendinopathy.  I discussed treatment regimen of steroid injection, anti-inflammatory, supplements, and formal therapy.  Surgery not warranted.  I administered mixture of 2 cc 0.25% bupivacaine1 cc Depo-Medrol to the right ankle joint without complication.  Take meloxicam 15 mg once daily with food for 30 days and not to take ibuprofen or Aleve, but may take Tylenol.  Apply topical diclofenac gel 3 times daily for the next 10 days.  May do Epsom salt soaks.  Take turmeric and tart cherry supplements.  May continue cam boot for another 2 weeks.  Start formal therapy as soon as possible and do home exercises as directed.  I will refer to physical therapy orthotist for custom orthotics to help improve function.  She will follow-up in 4 weeks for reevaluation.    Orders:    Ambulatory Referral to Orthopedic Surgery    meloxicam (Mobic) 15 mg tablet; Take 1 tablet (15 mg total) by mouth daily    Ambulatory Referral to Physical Therapy; Future    Medium joint arthrocentesis: R ankle    Achilles tendinosis of right ankle  64-year-old female with acute worsening of chronic right ankle pain from injury on 2025.  X-rays of the right ankle noted for cortical irregularity at the tip lateral malleolus with degenerative changes of the ankle joint.  Clinical impression is that she has symptoms from combination of  aggravation of arthritis, acute on chronic sprains, and tendinopathy.  I discussed treatment regimen of steroid injection, anti-inflammatory, supplements, and formal therapy.  Surgery not warranted.  I administered mixture of 2 cc 0.25% bupivacaine1 cc Depo-Medrol to the right ankle joint without complication.  Take meloxicam 15 mg once daily with food for 30 days and not to take ibuprofen or Aleve, but may take Tylenol.  Apply topical diclofenac gel 3 times daily for the next 10 days.  May do Epsom salt soaks.  Take turmeric and tart cherry supplements.  May continue cam boot for another 2 weeks.  Start formal therapy as soon as possible and do home exercises as directed.  I will refer to physical therapy orthotist for custom orthotics to help improve function.  She will follow-up in 4 weeks for reevaluation.  Orders:    Ambulatory Referral to Physical Therapy; Future    Sprain of anterior talofibular ligament of right ankle, initial encounter    Orders:    Ambulatory Referral to Physical Therapy; Future    Sprain of deltoid ligament of right ankle, initial encounter    Orders:    Ambulatory Referral to Physical Therapy; Future    Ankle stiffness, right    Orders:    Ambulatory Referral to Physical Therapy; Future    Ankle instability, right    Orders:    Ambulatory Referral to Physical Therapy; Future    Posterior tibial tendon dysfunction (PTTD) of both lower extremities  64-year-old female with acute worsening of chronic right ankle pain from injury on 5/1/2025.  X-rays of the right ankle noted for cortical irregularity at the tip lateral malleolus with degenerative changes of the ankle joint.  Clinical impression is that she has symptoms from combination of aggravation of arthritis, acute on chronic sprains, and tendinopathy.  I discussed treatment regimen of steroid injection, anti-inflammatory, supplements, and formal therapy.  Surgery not warranted.  I administered mixture of 2 cc 0.25% bupivacaine1 cc  "Depo-Medrol to the right ankle joint without complication.  Take meloxicam 15 mg once daily with food for 30 days and not to take ibuprofen or Aleve, but may take Tylenol.  Apply topical diclofenac gel 3 times daily for the next 10 days.  May do Epsom salt soaks.  Take turmeric and tart cherry supplements.  May continue cam boot for another 2 weeks.  Start formal therapy as soon as possible and do home exercises as directed.  I will refer to physical therapy orthotist for custom orthotics to help improve function.  She will follow-up in 4 weeks for reevaluation.  Orders:    Ambulatory Referral to Physical Therapy; Future      Medium joint arthrocentesis: R ankle    Performed by: Hilary Lewis DO  Authorized by: Hilary Lewis DO    Universal Protocol:  procedure performed by consultantConsent: Verbal consent obtained.  Risks and benefits: risks, benefits and alternatives were discussed  Consent given by: patient  Time out: Immediately prior to procedure a \"time out\" was called to verify the correct patient, procedure, equipment, support staff and site/side marked as required.  Patient understanding: patient states understanding of the procedure being performed  Patient consent: the patient's understanding of the procedure matches consent given  Procedure consent: procedure consent matches procedure scheduled  Relevant documents: relevant documents present and verified  Test results: test results available and properly labeled  Site marked: the operative site was marked  Radiology Images displayed and confirmed. If images not available, report reviewed: imaging studies available  Required items: required blood products, implants, devices, and special equipment available  Patient identity confirmed: verbally with patient  Supporting Documentation  Indications: pain   Procedure Details  Location: ankle - R ankle  Preparation: Patient was prepped and draped in the usual sterile " "fashion  Needle size: 27 G  Ultrasound guidance: no  Approach: dorsal  Medications administered: 2 mL bupivacaine 0.25 %; 1 mL methylPREDNISolone acetate 40 mg/mL    Patient tolerance: patient tolerated the procedure well with no immediate complications  Dressing:  Sterile dressing applied          History of Present Illness   Chief Complaint   Patient presents with    Right Ankle - Pain, Swelling      HPI  Mera Mendez is a 64 y.o. female with chronic right ankle pain who presents for acute worsening of right ankle pain and swelling sustained from injury on 5/1/2025.  She tripped going down the stairs and landed on her right knee and process twisted her ankle.  Prior to this incident she had previous issue to ankle from injury years ago.  She underwent surgery 8/16/2024 for arthroscopic debridement of medial malleolar fracture and deltoid repair.  She states she still continued with pain after surgery.  She reports having pain described generalized to the ankle but worse at the medial and anterior lateral aspects, constant, achy and throbbing, worse with bearing weight and ambulating, associated with swelling, and improved with resting.  She has managed symptoms with taking Tylenol and icing.  Following recent injury on 5/1/2025.  She had worsening pain.  She presented to the emergency room where there was suspicion for cortical irregularity of the lateral lateral malleolus.  She was placed in cam boot and advised to follow-up with orthopedic care.     History obtained from: patient and patient's Significant Other    Review of Systems   Musculoskeletal:  Positive for arthralgias and joint swelling.   Neurological:  Negative for weakness and numbness.          Objective   Ht 5' 5\" (1.651 m)   Wt 127 kg (280 lb) Comment: fish has cam boot on  LMP  (LMP Unknown)   BMI 46.59 kg/m²      Physical Exam  Vitals and nursing note reviewed.   Constitutional:       Appearance: Normal appearance. She is " well-developed. She is not ill-appearing or diaphoretic.   HENT:      Head: Normocephalic and atraumatic.      Right Ear: External ear normal.      Left Ear: External ear normal.   Eyes:      Conjunctiva/sclera: Conjunctivae normal.   Neck:      Trachea: No tracheal deviation.   Cardiovascular:      Rate and Rhythm: Normal rate.   Pulmonary:      Effort: Pulmonary effort is normal. No respiratory distress.   Abdominal:      General: There is no distension.   Skin:     General: Skin is warm and dry.   Neurological:      Mental Status: She is alert and oriented to person, place, and time.   Psychiatric:         Behavior: Behavior normal.          Right Ankle Exam     Tenderness   The patient is experiencing tenderness in the ATF, CF, deltoid, medial malleolus and lateral malleolus (Posterior tibial tendon, Achilles tendon).  Swelling: mild    Range of Motion   Dorsiflexion:  5   Plantar flexion:  10   Eversion:  5   Inversion:  5     Other   Sensation: normal  Pulse: present     Comments:  Foot pronation and pes planus    Mild tenderness tip of lateral malleolus  4+/5 dorsiflexion, plantarflexion, eversion, inversion  Negative passive external rotation          Left Ankle Exam     Comments:  Foot pronation and pes planus             I have personally reviewed pertinent films in PACS and my interpretation is  .   X-rays of the right ankle noted for cortical irregularity at the tip lateral malleolus with degenerative changes of the ankle joint.    Administrative Statements   I have spent a total time of 45 minutes in caring for this patient on the day of the visit/encounter including Diagnostic results, Prognosis, Risks and benefits of tx options, Instructions for management, Patient and family education, Importance of tx compliance, Impressions, Documenting in the medical record, Reviewing/placing orders in the medical record (including tests, medications, and/or procedures), Obtaining or reviewing history  , and  performing procedure .

## 2025-05-08 NOTE — ASSESSMENT & PLAN NOTE
64-year-old female with acute worsening of chronic right ankle pain from injury on 5/1/2025.  X-rays of the right ankle noted for cortical irregularity at the tip lateral malleolus with degenerative changes of the ankle joint.  Clinical impression is that she has symptoms from combination of aggravation of arthritis, acute on chronic sprains, and tendinopathy.  I discussed treatment regimen of steroid injection, anti-inflammatory, supplements, and formal therapy.  Surgery not warranted.  I administered mixture of 2 cc 0.25% bupivacaine1 cc Depo-Medrol to the right ankle joint without complication.  Take meloxicam 15 mg once daily with food for 30 days and not to take ibuprofen or Aleve, but may take Tylenol.  Apply topical diclofenac gel 3 times daily for the next 10 days.  May do Epsom salt soaks.  Take turmeric and tart cherry supplements.  May continue cam boot for another 2 weeks.  Start formal therapy as soon as possible and do home exercises as directed.  I will refer to physical therapy orthotist for custom orthotics to help improve function.  She will follow-up in 4 weeks for reevaluation.    Orders:    Ambulatory Referral to Orthopedic Surgery    meloxicam (Mobic) 15 mg tablet; Take 1 tablet (15 mg total) by mouth daily    Ambulatory Referral to Physical Therapy; Future    Medium joint arthrocentesis: R ankle

## 2025-05-13 ENCOUNTER — TELEMEDICINE (OUTPATIENT)
Dept: NEUROLOGY | Facility: CLINIC | Age: 65
End: 2025-05-13
Payer: MEDICARE

## 2025-05-13 DIAGNOSIS — G50.0 TRIGEMINAL NEURALGIA: Primary | ICD-10-CM

## 2025-05-13 PROCEDURE — 99212 OFFICE O/P EST SF 10 MIN: CPT | Performed by: PSYCHIATRY & NEUROLOGY

## 2025-05-13 PROCEDURE — G2211 COMPLEX E/M VISIT ADD ON: HCPCS | Performed by: PSYCHIATRY & NEUROLOGY

## 2025-05-13 RX ORDER — LOSARTAN POTASSIUM 100 MG/1
TABLET ORAL
COMMUNITY
Start: 2025-05-07

## 2025-05-13 RX ORDER — OXCARBAZEPINE 150 MG/1
150 TABLET, FILM COATED ORAL
COMMUNITY

## 2025-05-13 RX ORDER — HYDROCHLOROTHIAZIDE 12.5 MG/1
TABLET ORAL
COMMUNITY
Start: 2025-05-07

## 2025-05-13 RX ORDER — ROSUVASTATIN CALCIUM 20 MG/1
20 TABLET, COATED ORAL DAILY
COMMUNITY
Start: 2025-02-20 | End: 2026-02-20

## 2025-05-13 RX ORDER — CYCLOBENZAPRINE HCL 5 MG
5 TABLET ORAL DAILY PRN
COMMUNITY
Start: 2025-02-20

## 2025-05-13 NOTE — PROGRESS NOTES
Virtual Regular VisitName: Mera Mendez      : 1960      MRN: [id number]  Encounter Provider: Linnette Arcos MD  Encounter Date: 2025   Encounter department: [facility] [place]  :  Assessment & Plan  Trigeminal neuralgia  Patient with history of trigeminal neuralgia maintained on Oxcarbazepine. Today she reports she has been doing a lot better. Has been taking 150 mg of Oxcarbazepine once at night. She tried to stop it altogether however her pain recurred. Had a discussion with patient and we will try going to one tab every other day. To see if pain stays at bay. Would resume current dose if pain returns.Does not need any refills. Follow up in 6 months.            History of Present Illness     HPI  Patient is very pleasant right handed 64-year-old female with history of anemia, rheumatoid arthritis, asthma, carpal tunnel syndrome, concussion, COPD, and cyst, lumbar radiculopathy, hypertension, hidradenitis, obesity, osteoarthritis, hyperlipidemia, hypertrophic cardiomyopathy, type 2 diabetes thyroid nodule and vitamin D deficiency who presents for trigeminal neuralgia follow-up.      Initial visit (2024):  Trigeminal neuralgia present since 2016 when she had a tooth extraction from her right upper jaw.  Patient was placed under anesthesia for the procedure.  When she woke up she had an intense pain.  Patient was placed on oxcarbazepine which improved the pain.     Today patient reports that she recently got all of her teeth removed and the pain resolved.  She weaned herself off of the oxcarbazepine.  She states that she is only taking 300 mg in the morning.  She is completely pain-free.     Okay to discontinue oxcarbazepine now that she is pain-free.  Will see her again in 6 months just to ensure stability.     Interval history:    Patient doing a lot better  The oxcarbazepine 150mg has been taking once at night.   Tried to stop it all together but the pain  started again  Does not need any refills     Mera states she has been fine but she is still taking the Oxcarbazepine one time daily at night due to having that pain in that tooth removal area when not taking it. She said she's taking one pill at a lower dosage but does not know the exact dose.     Review of Systems   Constitutional:  Negative for appetite change, fatigue and fever.   HENT: Negative.  Negative for hearing loss, tinnitus, trouble swallowing and voice change.    Eyes: Negative.  Negative for photophobia, pain and visual disturbance.   Respiratory: Negative.  Negative for shortness of breath.    Cardiovascular: Negative.  Negative for palpitations.   Gastrointestinal: Negative.  Negative for nausea and vomiting.   Endocrine: Negative.  Negative for cold intolerance.   Genitourinary: Negative.  Negative for dysuria, frequency and urgency.   Musculoskeletal:  Negative for back pain, gait problem, myalgias, neck pain and neck stiffness.   Skin: Negative.  Negative for rash.   Allergic/Immunologic: Negative.    Neurological:  Negative for dizziness, tremors, seizures, syncope, facial asymmetry, speech difficulty, weakness, light-headedness, numbness and headaches.        Pt states she has been fine but she is still taking the Oxcarbazepine one time daily at night due to having that pain in that tooth removal area when not taking it. She said she's taking a lower dosage but does not know the exact dose.   Hematological: Negative.  Does not bruise/bleed easily.   Psychiatric/Behavioral: Negative.  Negative for confusion, hallucinations and sleep disturbance.    All other systems reviewed and are negative.      Objective   LMP  (LMP Unknown)     Physical Exam    Administrative Statements   Encounter provider Linnette Arcos MD    The Patient is located at Home and in the following state in which I hold an active license [place].    The patient was identified by name and date of birth. Mera Mendez  was informed that this is a telemedicine visit and that the visit is being conducted through the Epic Embedded platform. She agrees to proceed..  My office door was closed. No one else was in the room.  She acknowledged consent and understanding of privacy and security of the video platform. The patient has agreed to participate and understands they can discontinue the visit at any time.

## 2025-05-28 ENCOUNTER — EVALUATION (OUTPATIENT)
Dept: PHYSICAL THERAPY | Facility: CLINIC | Age: 65
End: 2025-05-28
Attending: FAMILY MEDICINE
Payer: MEDICARE

## 2025-05-28 DIAGNOSIS — M19.071 ARTHRITIS OF RIGHT ANKLE: Primary | ICD-10-CM

## 2025-05-28 DIAGNOSIS — M76.821 POSTERIOR TIBIAL TENDON DYSFUNCTION (PTTD) OF BOTH LOWER EXTREMITIES: ICD-10-CM

## 2025-05-28 DIAGNOSIS — M76.822 POSTERIOR TIBIAL TENDON DYSFUNCTION (PTTD) OF BOTH LOWER EXTREMITIES: ICD-10-CM

## 2025-05-28 DIAGNOSIS — M67.873 ACHILLES TENDINOSIS OF RIGHT ANKLE: ICD-10-CM

## 2025-05-28 DIAGNOSIS — S93.491D SPRAIN OF ANTERIOR TALOFIBULAR LIGAMENT OF RIGHT ANKLE, SUBSEQUENT ENCOUNTER: ICD-10-CM

## 2025-05-28 PROCEDURE — 97161 PT EVAL LOW COMPLEX 20 MIN: CPT

## 2025-05-28 PROCEDURE — 97110 THERAPEUTIC EXERCISES: CPT

## 2025-05-28 NOTE — PROGRESS NOTES
PT Evaluation     Today's date: 2025  Patient name: Mera Mendez  : 1960  MRN: [id number]  Referring provider: Hilary Lewis*  Dx:   Encounter Diagnosis     ICD-10-CM    1. Arthritis of right ankle  M19.071       2. Achilles tendinosis of right ankle  M67.873       3. Sprain of anterior talofibular ligament of right ankle, subsequent encounter  S93.574T       4. Posterior tibial tendon dysfunction (PTTD) of both lower extremities  M76.821     M76.822           Start Time: 0810  Stop Time: 0900  Total time in clinic (min): 50 minutes    Assessment  Impairments: abnormal gait, abnormal or restricted ROM, activity intolerance, impaired balance, impaired physical strength, lacks appropriate home exercise program, pain with function, weight-bearing intolerance, unable to perform ADL, participation limitations, activity limitations and endurance  Symptom irritability: high    Assessment details: Pt is a 63 yo F presenting to the physical therapy clinic with c/c of R ankle pain. Pt presents with no red flags at this time. Physical examination reveals limited and painful A/PROM of the R ankle, TTP of the muscles and tendons surrounding the ankle, gross weakness of the R ankle, functional pes planus, and hypertonicity of the posterior tibialis muscle belly and Achilles tendon. These impairments limit the pt's ability to ambulate, navigate stairs, and stand w/o pain and compensations. Skilled PT is medically necessary to address impairments and improve pt's QOL. Pt was educated on diagnosis, prognosis, POC, HEP, and activity modifications. All pt questions were answered appropriately. Pt acknowledged with good understanding.     Understanding of Dx/Px/POC: good     Prognosis: good    Goals  STG:  Pt will improve ROM by 5* where limited.  Pt will improve strength by 1/2 grade where limited.  Pt will report <9/10 pain at worst in order to demonstrate improved symptom modulation.    LTG:  Pt will  improve ROM by 10* where limited.  Pt will improve strength by 1 grade where limited.  Pt will report <5/10 pain at worst in order to demonstrate improved symptom modulation.        Plan  Patient would benefit from: skilled physical therapy  Planned modality interventions: cryotherapy and thermotherapy: hydrocollator packs    Planned therapy interventions: balance, gait training, home exercise program, neuromuscular re-education, strengthening, stretching, therapeutic activities, therapeutic exercise and joint mobilization    Frequency: 2x week  Duration in weeks: 12  Plan of Care beginning date: 2025  Plan of Care expiration date: 2025  Treatment plan discussed with: patient      Subjective Evaluation    History of Present Illness  Mechanism of injury: Pt presents to the physical therapy clinic with c/c of R ankle pain. Pt notes chronic history of R ankle issues with recent worsening following fall ~4 weeks ago. Pt notes surgical history to the R ankle but cannot recall exactly when the surgery was performed or what exactly was done. Pt notes most difficulty with prolonged ambulation, prolonged standing, and stair negotiation. Pain is typically localized to the medial ankle. Pt notes the ankle will swell extensively at the end of the day. Her goals for therapy are to reduce her pain and improve her standing tolerance.   Quality of life: good    Patient Goals  Patient goals for therapy: independence with ADLs/IADLs, decreased pain, increased strength, improved balance and return to sport/leisure activities    Pain  Current pain ratin  At best pain ratin  At worst pain rating: 3  Quality: sharp  Relieving factors: rest  Aggravating factors: standing, walking and stair climbing  Progression: worsening    Social Support  Steps to enter house: yes  4  12  Lives in: multiple-level home  Lives with: spouse      Diagnostic Tests  X-ray: abnormal      Objective     Static Posture     Ankle/Foot    Ankle/Foot (Left): Pes planus.   Ankle/Foot (Right): Pes planus.     Palpation     Right   Tenderness of the peroneus and posterior tibialis.     Additional Palpation Details  TTP to Achilles tendon    Tenderness     Right Ankle/Foot   Tenderness in the fifth metatarsal base, lateral malleolus, plantar fascia and proximal Achilles.     Active Range of Motion   Left Ankle/Foot   Normal active range of motion    Right Ankle/Foot   Dorsiflexion (ke): -5 degrees with pain  Dorsiflexion (kf): 30 degrees with pain    Passive Range of Motion     Right Ankle/Foot    Dorsiflexion (ke): 5 degrees with pain    Strength/Myotome Testing     Left Ankle/Foot   Dorsiflexion: 4+  Plantar flexion: 4+  Inversion: 4+  Eversion: 4+    Right Ankle/Foot   Dorsiflexion: 4-  Plantar flexion: 4-  Inversion: 4-  Eversion: 4-    Swelling   Left Ankle/Foot   Figure 8: 55 cm    Right Ankle/Foot   Figure 8: 55 cm    Ambulation   Weight-Bearing Status   Weight-Bearing Status (Right): weight-bearing as tolerated      Additional Weight-Bearing Status Details  CAM boot donned    Observational Gait   Gait: antalgic     Comments   Pt did not wish to ambulate on carpet w/o socks/shoes, will assess NV      Flowsheet Rows      Flowsheet Row Most Recent Value   PT/OT G-Codes    Current Score 42   Projected Score 51               Precautions: Hx of R Ankle Surgery, B/L TKA  POC expires Unit limit Auth Expiration date PT/OT/ST + Visit Limit?   8/20/25 BOMN NA BOMN                           Visit/Unit Tracking  AUTH Status:  Date 5/28              NA Used 1               Remaining                      Manuals 5/28                                                                Neuro Re-Ed                                                                                                        Ther Ex             Pt edu SG            Ankle Alphabet HEP            Toe raise HEP            Heel Raise HEP            Inversion/Eversion AROM HEP            4 way  ankle bands HEP                                      Ther Activity                                       Gait Training                                       Modalities

## 2025-06-03 ENCOUNTER — OFFICE VISIT (OUTPATIENT)
Dept: PHYSICAL THERAPY | Facility: CLINIC | Age: 65
End: 2025-06-03
Attending: FAMILY MEDICINE
Payer: MEDICARE

## 2025-06-03 DIAGNOSIS — M19.071 ARTHRITIS OF RIGHT ANKLE: Primary | ICD-10-CM

## 2025-06-03 DIAGNOSIS — M76.822 POSTERIOR TIBIAL TENDON DYSFUNCTION (PTTD) OF BOTH LOWER EXTREMITIES: ICD-10-CM

## 2025-06-03 DIAGNOSIS — S93.491D SPRAIN OF ANTERIOR TALOFIBULAR LIGAMENT OF RIGHT ANKLE, SUBSEQUENT ENCOUNTER: ICD-10-CM

## 2025-06-03 DIAGNOSIS — M76.821 POSTERIOR TIBIAL TENDON DYSFUNCTION (PTTD) OF BOTH LOWER EXTREMITIES: ICD-10-CM

## 2025-06-03 DIAGNOSIS — M67.873 ACHILLES TENDINOSIS OF RIGHT ANKLE: ICD-10-CM

## 2025-06-03 PROCEDURE — 97110 THERAPEUTIC EXERCISES: CPT

## 2025-06-03 NOTE — PROGRESS NOTES
"Daily Note     Today's date: 6/3/2025  Patient name: Mera Mendez  : 1960  MRN: [id number]  Referring provider: Hilary Lewis*  Dx:   Encounter Diagnosis     ICD-10-CM    1. Arthritis of right ankle  M19.071       2. Achilles tendinosis of right ankle  M67.873       3. Sprain of anterior talofibular ligament of right ankle, subsequent encounter  S93.161K       4. Posterior tibial tendon dysfunction (PTTD) of both lower extremities  M76.821     M76.822           Start Time: 0815  Stop Time: 901  Total time in clinic (min): 46 minutes    Subjective: Pt reports persisting difficulty walking. Her pain is no worse today.       Objective: See treatment diary below      Assessment: Tolerated treatment well with no adverse effects. Pt demonstrated improved AROM with alphabet exercise and was able to tolerate ankle bands without significant pain today. Patient demonstrated fatigue post treatment and would benefit from continued PT      Plan: Progress treatment as tolerated.       Precautions: Hx of R Ankle Surgery, B/L TKA  POC expires Unit limit Auth Expiration date PT/OT/ST + Visit Limit?   25 BOMN NA BOMN                           Visit/Unit Tracking  AUTH Status:  Date 5/28 6/3             NA Used 1 2              Remaining                      Manuals 5/28 6/3                                                               Neuro Re-Ed             balance  3x30\"                                                                                         Ther Ex             Pt edu SG            Ankle Alphabet HEP 2x through           Toe raise HEP 2x20           Heel Raise HEP 2x20           Inversion/Eversion AROM HEP            4 way ankle bands HEP Yellow  x20ea           Bridges  3x10           Inv/ev walks  red  x20           Calf raise  2x20           Ther Activity                                       Gait Training                                       Modalities                                "

## 2025-06-05 ENCOUNTER — OFFICE VISIT (OUTPATIENT)
Dept: OBGYN CLINIC | Facility: CLINIC | Age: 65
End: 2025-06-05
Payer: MEDICARE

## 2025-06-05 VITALS — BODY MASS INDEX: 45.98 KG/M2 | WEIGHT: 276 LBS | HEIGHT: 65 IN

## 2025-06-05 DIAGNOSIS — F40.240 CLAUSTROPHOBIA: ICD-10-CM

## 2025-06-05 DIAGNOSIS — S99.911D RIGHT ANKLE INJURY, SUBSEQUENT ENCOUNTER: Primary | ICD-10-CM

## 2025-06-05 PROCEDURE — 99213 OFFICE O/P EST LOW 20 MIN: CPT | Performed by: FAMILY MEDICINE

## 2025-06-05 RX ORDER — ALPRAZOLAM 0.5 MG
0.5 TABLET ORAL
Qty: 2 TABLET | Refills: 0 | Status: SHIPPED | OUTPATIENT
Start: 2025-06-05

## 2025-06-05 NOTE — PROGRESS NOTES
Name: Mera Mendez      : 1960      MRN: [id number]  Encounter Provider: Hilary Lewis DO  Encounter Date: 2025   Encounter department: [facility] [place]  :  Assessment & Plan  Right ankle injury, subsequent encounter  64-year-old female with acute worsening of chronic right ankle pain from injury on 2025.  X-rays of the right ankle suggestive of osseous irregularity at the tip of lateral malleolus.  Symptoms persist despite conservative management of steroid injection 2025, meloxicam 15 mg once daily since 2025, wearing cam boot for 3 weeks, formal therapy and home exercises.  At this time I will refer for MRI of the right ankle to evaluate for stress fracture and osteochondral lesion as invasive management may be warranted.  She will return after having MRI done.  Orders:    MRI ankle/heel right  wo contrast; Future    Claustrophobia    Orders:    ALPRAZolam (XANAX) 0.5 mg tablet; Take 1 tablet (0.5 mg total) by mouth 30 min pre-procedure Take 2nd dose 30 mins after 1st dose if still anxious        History of Present Illness   Chief Complaint   Patient presents with    Right Ankle - Follow-up, Pain      HPI  Mera Mendez is a 64 y.o. female who presents for follow-up of acute worsening of chronic right ankle pain from injury on 2025.  She was last seen by me 4 weeks ago at which point she was given a steroid injection to the right ankle, prescribed meloxicam, advised on cam boot, and referred to formal therapy.  She reports mild improvement in symptoms since her last visit.  She has been attending formal therapy and doing home exercises since 2025.  She reports to having bothersome pain described localized mainly to the medial aspect the ankle, achy and burning, worse with bearing weight and ambulating, worse with twisting, and improved with resting.    History obtained from: patient    Review of Systems   Musculoskeletal:  " Positive for arthralgias and joint swelling.   Neurological:  Negative for weakness and numbness.          Objective   Ht 5' 5\" (1.651 m)   Wt 125 kg (276 lb)   LMP  (LMP Unknown)   BMI 45.93 kg/m²      Physical Exam  Vitals and nursing note reviewed.   Constitutional:       Appearance: Normal appearance. She is well-developed. She is not ill-appearing or diaphoretic.   HENT:      Head: Normocephalic and atraumatic.      Right Ear: External ear normal.      Left Ear: External ear normal.     Eyes:      Conjunctiva/sclera: Conjunctivae normal.     Neck:      Trachea: No tracheal deviation.   Pulmonary:      Effort: Pulmonary effort is normal. No respiratory distress.   Abdominal:      General: There is no distension.     Musculoskeletal:         General: Swelling and tenderness present. No deformity or signs of injury.     Skin:     General: Skin is warm and dry.      Coloration: Skin is not jaundiced or pale.     Neurological:      Mental Status: She is alert and oriented to person, place, and time.     Psychiatric:         Mood and Affect: Mood normal.         Behavior: Behavior normal.         Thought Content: Thought content normal.         Judgment: Judgment normal.       Right Ankle Exam     Tenderness   The patient is experiencing tenderness in the medial malleolus and deltoid (Posterior tibialis tendon).  Swelling: mild    Range of Motion   Dorsiflexion:  5   Plantar flexion:  normal   Eversion:  5   Inversion:  0     Muscle Strength   Dorsiflexion:  5/5  Plantar flexion:  5/5    Other   Sensation: normal     Comments:  5/5 eversion  4+/5 inversion  Negative passive external rotation  Negative syndesmosis squeeze             Administrative Statements   I have spent a total time of 20 minutes in caring for this patient on the day of the visit/encounter including Diagnostic results, Prognosis, Risks and benefits of tx options, Instructions for management, Impressions, Documenting in the medical record, " Reviewing/placing orders in the medical record (including tests, medications, and/or procedures), and Obtaining or reviewing history  .

## 2025-06-06 ENCOUNTER — OFFICE VISIT (OUTPATIENT)
Dept: PHYSICAL THERAPY | Facility: CLINIC | Age: 65
End: 2025-06-06
Attending: FAMILY MEDICINE
Payer: MEDICARE

## 2025-06-06 DIAGNOSIS — M19.071 ARTHRITIS OF RIGHT ANKLE: Primary | ICD-10-CM

## 2025-06-06 DIAGNOSIS — M67.873 ACHILLES TENDINOSIS OF RIGHT ANKLE: ICD-10-CM

## 2025-06-06 DIAGNOSIS — S93.491D SPRAIN OF ANTERIOR TALOFIBULAR LIGAMENT OF RIGHT ANKLE, SUBSEQUENT ENCOUNTER: ICD-10-CM

## 2025-06-06 DIAGNOSIS — M76.822 POSTERIOR TIBIAL TENDON DYSFUNCTION (PTTD) OF BOTH LOWER EXTREMITIES: ICD-10-CM

## 2025-06-06 DIAGNOSIS — M76.821 POSTERIOR TIBIAL TENDON DYSFUNCTION (PTTD) OF BOTH LOWER EXTREMITIES: ICD-10-CM

## 2025-06-06 PROCEDURE — 97140 MANUAL THERAPY 1/> REGIONS: CPT

## 2025-06-06 PROCEDURE — 97110 THERAPEUTIC EXERCISES: CPT

## 2025-06-06 NOTE — ASSESSMENT & PLAN NOTE
Patient with history of trigeminal neuralgia maintained on Oxcarbazepine. Today she reports she has been doing a lot better. Has been taking 150 mg of Oxcarbazepine once at night. She tried to stop it altogether however her pain recurred. Had a discussion with patient and we will try going to one tab every other day. To see if pain stays at bay. Would resume current dose if pain returns.Does not need any refills. Follow up in 6 months.

## 2025-06-06 NOTE — PROGRESS NOTES
"Daily Note     Today's date: 2025  Patient name: Mera Mendez  : 1960  MRN: [id number]  Referring provider: Hilary Lewis*  Dx:   Encounter Diagnosis     ICD-10-CM    1. Arthritis of right ankle  M19.071       2. Achilles tendinosis of right ankle  M67.873       3. Sprain of anterior talofibular ligament of right ankle, subsequent encounter  S93.868C       4. Posterior tibial tendon dysfunction (PTTD) of both lower extremities  M76.821     M76.822           Start Time: 801  Stop Time: 854  Total time in clinic (min): 53 minutes    Subjective: Pt reports residual soreness following last session that has improved since. She reports she did have a fair amount of discomfort last night requiring her to take motrin.       Objective: See treatment diary below      Assessment: Tolerated treatment well with no adverse effects. Pt much less tender to palpation and was able to tolerate IASTM to the achilles w/o pain. All PREs were handled well. Patient demonstrated fatigue post treatment and would benefit from continued PT      Plan: Progress treatment as tolerated.       Precautions: Hx of R Ankle Surgery, B/L TKA  POC expires Unit limit Auth Expiration date PT/OT/ST + Visit Limit?   25 BOMN NA BOMN                           Visit/Unit Tracking  AUTH Status:  Date 5/28 6/3 6/6            NA Used 1 2 3             Remaining                      Manuals 5/28 6/3 6/6          STM calf   SG          DF mob   SG                                    Neuro Re-Ed             balance  3x30\"                                                                                         Ther Ex             Pt edu SG            Ankle Alphabet HEP 2x through 2x  through          Toe raise HEP 2x20           Heel Raise HEP 2x20           Inversion/Eversion AROM HEP            4 way ankle bands HEP Yellow  x20ea Red  x20ea          Bridges  3x10 3x10          Inv/ev walks  red  x20 Red  x20          Calf raise  2x20 " 2x20          Ther Activity                                       Gait Training                                       Modalities

## 2025-06-10 ENCOUNTER — OFFICE VISIT (OUTPATIENT)
Dept: PHYSICAL THERAPY | Facility: CLINIC | Age: 65
End: 2025-06-10
Attending: FAMILY MEDICINE
Payer: MEDICARE

## 2025-06-10 DIAGNOSIS — M76.822 POSTERIOR TIBIAL TENDON DYSFUNCTION (PTTD) OF BOTH LOWER EXTREMITIES: ICD-10-CM

## 2025-06-10 DIAGNOSIS — M67.873 ACHILLES TENDINOSIS OF RIGHT ANKLE: ICD-10-CM

## 2025-06-10 DIAGNOSIS — S93.491D SPRAIN OF ANTERIOR TALOFIBULAR LIGAMENT OF RIGHT ANKLE, SUBSEQUENT ENCOUNTER: ICD-10-CM

## 2025-06-10 DIAGNOSIS — M19.071 ARTHRITIS OF RIGHT ANKLE: Primary | ICD-10-CM

## 2025-06-10 DIAGNOSIS — M76.821 POSTERIOR TIBIAL TENDON DYSFUNCTION (PTTD) OF BOTH LOWER EXTREMITIES: ICD-10-CM

## 2025-06-10 PROCEDURE — 97140 MANUAL THERAPY 1/> REGIONS: CPT

## 2025-06-10 PROCEDURE — 97110 THERAPEUTIC EXERCISES: CPT

## 2025-06-10 NOTE — PROGRESS NOTES
"Daily Note     Today's date: 6/10/2025  Patient name: Mera Mendez  : 1960  MRN: [id number]  Referring provider: Hilary Lewis*  Dx:   Encounter Diagnosis     ICD-10-CM    1. Arthritis of right ankle  M19.071       2. Achilles tendinosis of right ankle  M67.873       3. Sprain of anterior talofibular ligament of right ankle, subsequent encounter  S93.190G       4. Posterior tibial tendon dysfunction (PTTD) of both lower extremities  M76.821     M76.822             Start Time: 810  Stop Time: 852  Total time in clinic (min): 42 minutes    Subjective: Pt reports increased pain over the weekend that she attributes to being on her feet a lot. She localizes this pain to inferior/posterior to the medial malleolus.      Objective: See treatment diary below  8/10 pain at start of session  5/10 pain at end of session    Assessment: Tolerated treatment well with no adverse effects. Seated post tib heel raise reduced pain to a 6/10 with ambulation. Further post tib/inversion isolation reduced pain to a 5/10 with ambulation. Significant improvements within the session. Patient demonstrated fatigue post treatment and would benefit from continued PT      Plan: Progress treatment as tolerated.       Precautions: Hx of R Ankle Surgery, B/L TKA  POC expires Unit limit Auth Expiration date PT/OT/ST + Visit Limit?   25 BOMN NA BOMN                           Visit/Unit Tracking  AUTH Status:  Date 5/28 6/3 6/6 6/10           NA Used 1 2 3 4            Remaining                      Manuals 5/28 6/3 6/6 6/10         STM calf   SG SG         DF mob   SG                                    Neuro Re-Ed             balance  3x30\"                                                                                         Ther Ex             Pt edu SG            Ankle Alphabet HEP 2x through 2x  through          Toe raise HEP 2x20           Heel Raise HEP 2x20           Inversion/Eversion AROM HEP            4 way " "ankle bands HEP Yellow  x20ea Red  x20ea Inversion only,  Blue,  X20  5\"         Bridges  3x10 3x10          Inv/ev walks  red  x20 Red  x20          Calf raise  2x20 2x20          Seated post tib raise    Rmb  3x10  5\"         GT bias heel raise    x20         Ther Activity                                       Gait Training                                       Modalities                                                   "

## 2025-06-12 ENCOUNTER — OFFICE VISIT (OUTPATIENT)
Dept: PHYSICAL THERAPY | Facility: CLINIC | Age: 65
End: 2025-06-12
Attending: FAMILY MEDICINE
Payer: MEDICARE

## 2025-06-12 DIAGNOSIS — M19.071 ARTHRITIS OF RIGHT ANKLE: Primary | ICD-10-CM

## 2025-06-12 DIAGNOSIS — M76.821 POSTERIOR TIBIAL TENDON DYSFUNCTION (PTTD) OF BOTH LOWER EXTREMITIES: ICD-10-CM

## 2025-06-12 DIAGNOSIS — S93.491D SPRAIN OF ANTERIOR TALOFIBULAR LIGAMENT OF RIGHT ANKLE, SUBSEQUENT ENCOUNTER: ICD-10-CM

## 2025-06-12 DIAGNOSIS — M76.822 POSTERIOR TIBIAL TENDON DYSFUNCTION (PTTD) OF BOTH LOWER EXTREMITIES: ICD-10-CM

## 2025-06-12 DIAGNOSIS — M67.873 ACHILLES TENDINOSIS OF RIGHT ANKLE: ICD-10-CM

## 2025-06-12 PROCEDURE — 97110 THERAPEUTIC EXERCISES: CPT

## 2025-06-12 NOTE — PROGRESS NOTES
"Daily Note     Today's date: 2025  Patient name: Mera Mendez  : 1960  MRN: [id number]  Referring provider: Hilary Lewis*  Dx:   Encounter Diagnosis     ICD-10-CM    1. Arthritis of right ankle  M19.071       2. Achilles tendinosis of right ankle  M67.873       3. Sprain of anterior talofibular ligament of right ankle, subsequent encounter  S93.204N       4. Posterior tibial tendon dysfunction (PTTD) of both lower extremities  M76.821     M76.822             Start Time: 813  Stop Time: 859  Total time in clinic (min): 46 minutes    Subjective: Pt reports she noticed decreased pain following last session. She notes more pain yesterday due to spending a lot of time on her feet. Performing her HEP helped reduce her pain.      Objective: See treatment diary below  8/10 pain at start of session  5/10 pain at end of session    Assessment: Tolerated treatment well with no adverse effects. Seated post tib heel raise reduced pain to a 4/10 with ambulation. Further post tib/inversion isolation reduced pain to a 2/10 with ambulation. Significant improvements within the session. Patient demonstrated fatigue post treatment and would benefit from continued PT      Plan: Progress treatment as tolerated.       Precautions: Hx of R Ankle Surgery, B/L TKA  POC expires Unit limit Auth Expiration date PT/OT/ST + Visit Limit?   25 BOMN NA BOMN                           Visit/Unit Tracking  AUTH Status:  Date 5/28 6/3 6/6 6/10 6/12          NA Used 1 2 3 4 5           Remaining                      Manuals 5/28 6/3 6/6 6/10 6/12        STM calf   SG SG         DF mob   SG                                    Neuro Re-Ed             balance  3x30\"           Biodex     3x LOS                                                                         Ther Ex             Pt edu SG            Ankle Alphabet HEP 2x through 2x  through          Calf stretch     3x30\"          Toe raise HEP 2x20           Heel " "Raise HEP 2x20           Inversion/Eversion AROM HEP            4 way ankle bands HEP Yellow  x20ea Red  x20ea Inversion only,  Blue,  X20  5\"         Bridges  3x10 3x10          Inv/ev walks  red  x20 Red  x20  Red  2x15        Calf raise  2x20 2x20  2x20        Seated post tib raise    Rmb  3x10  5\" Rmb  3x10  5\"        GT bias heel raise    x20 5#  3x15        Towel scrunches     3x15        Ther Activity                                       Gait Training                                       Modalities                                                   "

## 2025-06-16 ENCOUNTER — APPOINTMENT (OUTPATIENT)
Dept: PHYSICAL THERAPY | Facility: CLINIC | Age: 65
End: 2025-06-16
Attending: FAMILY MEDICINE
Payer: MEDICARE

## 2025-06-18 ENCOUNTER — APPOINTMENT (OUTPATIENT)
Dept: PHYSICAL THERAPY | Facility: CLINIC | Age: 65
End: 2025-06-18
Attending: FAMILY MEDICINE
Payer: MEDICARE

## 2025-06-25 ENCOUNTER — OFFICE VISIT (OUTPATIENT)
Dept: PHYSICAL THERAPY | Facility: CLINIC | Age: 65
End: 2025-06-25
Attending: FAMILY MEDICINE
Payer: MEDICARE

## 2025-06-25 DIAGNOSIS — S93.491D SPRAIN OF ANTERIOR TALOFIBULAR LIGAMENT OF RIGHT ANKLE, SUBSEQUENT ENCOUNTER: ICD-10-CM

## 2025-06-25 DIAGNOSIS — M76.821 POSTERIOR TIBIAL TENDON DYSFUNCTION (PTTD) OF BOTH LOWER EXTREMITIES: ICD-10-CM

## 2025-06-25 DIAGNOSIS — M76.822 POSTERIOR TIBIAL TENDON DYSFUNCTION (PTTD) OF BOTH LOWER EXTREMITIES: ICD-10-CM

## 2025-06-25 DIAGNOSIS — M19.071 ARTHRITIS OF RIGHT ANKLE: Primary | ICD-10-CM

## 2025-06-25 DIAGNOSIS — M67.873 ACHILLES TENDINOSIS OF RIGHT ANKLE: ICD-10-CM

## 2025-06-25 PROCEDURE — 97112 NEUROMUSCULAR REEDUCATION: CPT

## 2025-06-25 PROCEDURE — 97110 THERAPEUTIC EXERCISES: CPT

## 2025-06-25 NOTE — PROGRESS NOTES
"Daily Note     Today's date: 2025  Patient name: Mera Mendez  : 1960  MRN: [id number]  Referring provider: Hilary Lewis*  Dx:   Encounter Diagnosis     ICD-10-CM    1. Arthritis of right ankle  M19.071       2. Achilles tendinosis of right ankle  M67.873       3. Sprain of anterior talofibular ligament of right ankle, subsequent encounter  S93.755I       4. Posterior tibial tendon dysfunction (PTTD) of both lower extremities  M76.821     M76.822             Start Time: 947  Stop Time: 1029  Total time in clinic (min): 42 minutes    Subjective: Pt reports she has noticed an improvement in her pain since starting PT.       Objective: See treatment diary below  6/10 pain at start of session  3/10 pain at end of session    Assessment: Tolerated treatment well with no adverse effects. Seated post tib heel raise reduced pain to a 4/10 with ambulation. Further post tib/inversion isolation reduced pain to a 3/10 with ambulation. Significant improvements within the session. Patient demonstrated fatigue post treatment and would benefit from continued PT      Plan: Progress treatment as tolerated.       Precautions: Hx of R Ankle Surgery, B/L TKA  POC expires Unit limit Auth Expiration date PT/OT/ST + Visit Limit?   25 BOMN NA BOMN                           Visit/Unit Tracking  AUTH Status:  Date 5/28 6/3 6/6 6/10 6/12 6/25         NA Used 1 2 3 4 5 6          Remaining                      Manuals 5/28 6/3 6/6 6/10 6/12 6/25       STM calf   SG SG         DF mob   SG                                    Neuro Re-Ed             Tandem stance  3x30\"    2x30\"ea       Biodex     3x LOS 3x  LOS                                                                        Ther Ex             Pt edu SG            Ankle Alphabet HEP 2x through 2x  through          Calf stretch     3x30\" 2x30\"       Toe raise HEP 2x20           Heel Raise HEP 2x20    2x20       Inversion/Eversion AROM HEP            4 way " "ankle bands HEP Yellow  x20ea Red  x20ea Inversion only,  Blue,  X20  5\"         Bridges  3x10 3x10          Inv/ev walks  red  x20 Red  x20  Red  2x15 Red  2x15       Calf raise  2x20 2x20  2x20        Seated post tib raise    Rmb  3x10  5\" Rmb  3x10  5\" Rmb  2x20       GT bias heel raise    x20 5#  3x15 2x15       Towel scrunches     3x15 2x15       Ther Activity                                       Gait Training                                       Modalities                                                   "

## 2025-06-27 ENCOUNTER — OFFICE VISIT (OUTPATIENT)
Dept: PHYSICAL THERAPY | Facility: CLINIC | Age: 65
End: 2025-06-27
Attending: FAMILY MEDICINE
Payer: MEDICARE

## 2025-06-27 DIAGNOSIS — M67.873 ACHILLES TENDINOSIS OF RIGHT ANKLE: ICD-10-CM

## 2025-06-27 DIAGNOSIS — M19.071 ARTHRITIS OF RIGHT ANKLE: Primary | ICD-10-CM

## 2025-06-27 DIAGNOSIS — M76.821 POSTERIOR TIBIAL TENDON DYSFUNCTION (PTTD) OF BOTH LOWER EXTREMITIES: ICD-10-CM

## 2025-06-27 DIAGNOSIS — M76.822 POSTERIOR TIBIAL TENDON DYSFUNCTION (PTTD) OF BOTH LOWER EXTREMITIES: ICD-10-CM

## 2025-06-27 DIAGNOSIS — S93.491D SPRAIN OF ANTERIOR TALOFIBULAR LIGAMENT OF RIGHT ANKLE, SUBSEQUENT ENCOUNTER: ICD-10-CM

## 2025-06-27 PROCEDURE — 97110 THERAPEUTIC EXERCISES: CPT

## 2025-06-27 PROCEDURE — 97112 NEUROMUSCULAR REEDUCATION: CPT

## 2025-06-27 NOTE — PROGRESS NOTES
"Daily Note     Today's date: 2025  Patient name: Mera Mendez  : 1960  MRN: [id number]  Referring provider: Hilary Lewis*  Dx:   Encounter Diagnosis     ICD-10-CM    1. Arthritis of right ankle  M19.071       2. Achilles tendinosis of right ankle  M67.873       3. Sprain of anterior talofibular ligament of right ankle, subsequent encounter  S93.697T       4. Posterior tibial tendon dysfunction (PTTD) of both lower extremities  M76.821     M76.822             Start Time: 0802  Stop Time: 0850  Total time in clinic (min): 48 minutes    Subjective: Pt reports today is another good day. She is in minimal pain today.       Objective: See treatment diary below  5/10 pain at start of session  3/10 pain at end of session    Assessment: Tolerated treatment well with no adverse effects. Balance shows improvement today with less LOB during tandem stance and improved score with Biodex limits of stability. Pt is becoming more tolerant to overall volume of sessions and Wbing activities. Ready for progressions to wbing exercises NV.Patient demonstrated fatigue post treatment and would benefit from continued PT      Plan: Progress treatment as tolerated.       Precautions: Hx of R Ankle Surgery, B/L TKA  POC expires Unit limit Auth Expiration date PT/OT/ST + Visit Limit?   25 BOMN NA BOMN                           Visit/Unit Tracking  AUTH Status:  Date 5/28 6/3 6/6 6/10 6/12 6/25 6/27        NA Used 1 2 3 4 5 6 7         Remaining                      Manuals 5/28 6/3 6/6 6/10 6/12 6/25 6/27      STM calf   SG SG         DF mob   SG                                    Neuro Re-Ed             Tandem stance  3x30\"    2x30\"ea       Biodex     3x LOS 3x  LOS 3x  LOS  80%                                                                       Ther Ex             Pt edu SG            Ankle Alphabet HEP 2x through 2x  through          Calf stretch     3x30\" 2x30\" 3x30\"      Toe raise HEP 2x20           Heel " "Raise HEP 2x20    2x20 2x20      Inversion/Eversion AROM HEP            4 way ankle bands HEP Yellow  x20ea Red  x20ea Inversion only,  Blue,  X20  5\"         Bridges  3x10 3x10          Inv/ev walks  red  x20 Red  x20  Red  2x15 Red  2x15 Red  2x15      Calf raise  2x20 2x20  2x20        Seated post tib raise    Rmb  3x10  5\" Rmb  3x10  5\" Rmb  2x20 Rmb  3x15  5\"      GT bias heel raise    x20 5#  3x15 2x15 2x15      Towel scrunches     3x15 2x15 2x15      Ther Activity                                       Gait Training                                       Modalities                                                   "

## 2025-06-30 ENCOUNTER — APPOINTMENT (OUTPATIENT)
Dept: PHYSICAL THERAPY | Facility: CLINIC | Age: 65
End: 2025-06-30
Attending: FAMILY MEDICINE
Payer: MEDICARE

## 2025-07-01 ENCOUNTER — HOSPITAL ENCOUNTER (OUTPATIENT)
Dept: MRI IMAGING | Facility: HOSPITAL | Age: 65
Discharge: HOME | End: 2025-07-01
Attending: FAMILY MEDICINE
Payer: MEDICARE

## 2025-07-01 DIAGNOSIS — S99.911D RIGHT ANKLE INJURY, SUBSEQUENT ENCOUNTER: ICD-10-CM

## 2025-07-01 PROCEDURE — 73721 MRI JNT OF LWR EXTRE W/O DYE: CPT

## 2025-07-02 ENCOUNTER — OFFICE VISIT (OUTPATIENT)
Dept: PHYSICAL THERAPY | Facility: CLINIC | Age: 65
End: 2025-07-02
Attending: FAMILY MEDICINE
Payer: MEDICARE

## 2025-07-02 DIAGNOSIS — M76.822 POSTERIOR TIBIAL TENDON DYSFUNCTION (PTTD) OF BOTH LOWER EXTREMITIES: ICD-10-CM

## 2025-07-02 DIAGNOSIS — M67.873 ACHILLES TENDINOSIS OF RIGHT ANKLE: ICD-10-CM

## 2025-07-02 DIAGNOSIS — M19.071 ARTHRITIS OF RIGHT ANKLE: Primary | ICD-10-CM

## 2025-07-02 DIAGNOSIS — S93.491D SPRAIN OF ANTERIOR TALOFIBULAR LIGAMENT OF RIGHT ANKLE, SUBSEQUENT ENCOUNTER: ICD-10-CM

## 2025-07-02 DIAGNOSIS — M76.821 POSTERIOR TIBIAL TENDON DYSFUNCTION (PTTD) OF BOTH LOWER EXTREMITIES: ICD-10-CM

## 2025-07-02 PROCEDURE — 97112 NEUROMUSCULAR REEDUCATION: CPT

## 2025-07-02 PROCEDURE — 97110 THERAPEUTIC EXERCISES: CPT

## 2025-07-02 NOTE — PROGRESS NOTES
"Daily Note     Today's date: 2025  Patient name: Mera Mendez  : 1960  MRN: [id number]  Referring provider: Hilary Lewis*  Dx:   Encounter Diagnosis     ICD-10-CM    1. Arthritis of right ankle  M19.071       2. Achilles tendinosis of right ankle  M67.873       3. Sprain of anterior talofibular ligament of right ankle, subsequent encounter  S93.482S       4. Posterior tibial tendon dysfunction (PTTD) of both lower extremities  M76.821     M76.822             Start Time: 08  Stop Time: 09  Total time in clinic (min): 40 minutes    Subjective: Pt reports today is another good day and that she is feeling good. She needs to take time off over the next few weeks to babysit her grandchildren.       Objective: See treatment diary below  5/10 pain at start of session  2/10 pain at end of session    Assessment: Tolerated treatment well with no adverse effects. Continued with plan w/o significant progressions to ensure pt is comfortable with completing HEP while away with grandchildren. Updated HEP was provided and all questions were answered. Patient demonstrated fatigue post treatment and would benefit from continued PT      Plan: Progress treatment as tolerated.       Precautions: Hx of R Ankle Surgery, B/L TKA  POC expires Unit limit Auth Expiration date PT/OT/ST + Visit Limit?   25 BOMN NA BOMN                           Visit/Unit Tracking  AUTH Status:  Date 5/28 6/3 6/6 6/10 6/12 6/25 6/27 7/2       NA Used 1 2 3 4 5 6 7 8        Remaining                      Manuals 5/28 6/3 6/6 6/10 6/12 6/25 6/27 7/2     STM calf   SG SG         DF mob   SG                                    Neuro Re-Ed             Tandem stance  3x30\"    2x30\"ea       Biodex     3x LOS 3x  LOS 3x  LOS  80% 3x  LOS  46%  77%  70%                                                                      Ther Ex             Pt edu SG            Ankle Alphabet HEP 2x through 2x  through          Calf stretch     3x30\" " "2x30\" 3x30\" 3x30\"     Toe raise HEP 2x20           Heel Raise HEP 2x20    2x20 2x20 2x20     Inversion/Eversion AROM HEP            4 way ankle bands HEP Yellow  x20ea Red  x20ea Inversion only,  Blue,  X20  5\"         Bridges  3x10 3x10          Inv/ev walks  red  x20 Red  x20  Red  2x15 Red  2x15 Red  2x15 Red  2x15     Calf raise  2x20 2x20  2x20        Seated post tib raise    Rmb  3x10  5\" Rmb  3x10  5\" Rmb  2x20 Rmb  3x15  5\" Rmb  2x20  5\"     GT bias heel raise    x20 5#  3x15 2x15 2x15      Towel scrunches     3x15 2x15 2x15      Ther Activity                                       Gait Training                                       Modalities                                                   "

## 2025-07-07 ENCOUNTER — OFFICE VISIT (OUTPATIENT)
Dept: OBGYN CLINIC | Facility: CLINIC | Age: 65
End: 2025-07-07
Payer: MEDICARE

## 2025-07-07 VITALS — BODY MASS INDEX: 46.92 KG/M2 | WEIGHT: 281.6 LBS | HEIGHT: 65 IN

## 2025-07-07 DIAGNOSIS — S93.491D SPRAIN OF ANTERIOR TALOFIBULAR LIGAMENT OF RIGHT ANKLE, SUBSEQUENT ENCOUNTER: ICD-10-CM

## 2025-07-07 DIAGNOSIS — S90.01XD CONTUSION OF RIGHT ANKLE, SUBSEQUENT ENCOUNTER: Primary | ICD-10-CM

## 2025-07-07 DIAGNOSIS — M76.821 POSTERIOR TIBIAL TENDON DYSFUNCTION (PTTD) OF BOTH LOWER EXTREMITIES: ICD-10-CM

## 2025-07-07 DIAGNOSIS — M76.822 POSTERIOR TIBIAL TENDON DYSFUNCTION (PTTD) OF BOTH LOWER EXTREMITIES: ICD-10-CM

## 2025-07-07 PROBLEM — S90.01XA CONTUSION OF RIGHT ANKLE: Status: ACTIVE | Noted: 2025-07-07

## 2025-07-07 PROCEDURE — 99213 OFFICE O/P EST LOW 20 MIN: CPT | Performed by: FAMILY MEDICINE

## 2025-07-07 NOTE — ASSESSMENT & PLAN NOTE
63-year-old female with acute worsening of chronic right ankle pain from injury on 5/1/2025.  MRI right ankle noted for:  1.  No fracture  2.  Bone contusions within the talus at the sinus tarsi, and at the medial malleolus  3.  Grade 1 ATFL sprain  4.  Possible chronic posterior tibialis split tear  Clinical impression is that she has symptoms from combination of sprain, contusion, and abnormal mechanics contributing to increased posterior tibialis dysfunction/strain.  I advised that surgery is not warranted.  I discussed treatment in the form of continued home exercise program, topical anti-inflammatory, and supportive footwear.  She may visit athletic shoe store to be advised in supportive footwear.  Apply topical diclofenac 3 times daily as needed.  May do Epsom salt soaks.  May wear cam boot few days at a time when symptoms are more aggravated.  Continue home exercise program and resume formal therapy when able.  Follow-up if no improvement in 1 month.

## 2025-07-07 NOTE — PATIENT INSTRUCTIONS
[employer] store in [place], [place]    Epsom Salts Soaks    Over the counter diclofenac gel/voltaren  - 3 times daily for 10 days    Home exercises

## 2025-07-07 NOTE — PROGRESS NOTES
Name: Mera Mendez      : 1960      MRN: [id number]  Encounter Provider: Hilary Lewis DO  Encounter Date: 2025   Encounter department: [facility] [place]  :  Assessment & Plan  Contusion of right ankle, subsequent encounter  63-year-old female with acute worsening of chronic right ankle pain from injury on 2025.  MRI right ankle noted for:  1.  No fracture  2.  Bone contusions within the talus at the sinus tarsi, and at the medial malleolus  3.  Grade 1 ATFL sprain  4.  Possible chronic posterior tibialis split tear  Clinical impression is that she has symptoms from combination of sprain, contusion, and abnormal mechanics contributing to increased posterior tibialis dysfunction/strain.  I advised that surgery is not warranted.  I discussed treatment in the form of continued home exercise program, topical anti-inflammatory, and supportive footwear.  She may visit athletic shoe store to be advised in supportive footwear.  Apply topical diclofenac 3 times daily as needed.  May do Epsom salt soaks.  May wear cam boot few days at a time when symptoms are more aggravated.  Continue home exercise program and resume formal therapy when able.  Follow-up if no improvement in 1 month.       Sprain of anterior talofibular ligament of right ankle, subsequent encounter  63-year-old female with acute worsening of chronic right ankle pain from injury on 2025.  MRI right ankle noted for:  1.  No fracture  2.  Bone contusions within the talus at the sinus tarsi, and at the medial malleolus  3.  Grade 1 ATFL sprain  4.  Possible chronic posterior tibialis split tear  Clinical impression is that she has symptoms from combination of sprain, contusion, and abnormal mechanics contributing to increased posterior tibialis dysfunction/strain.  I advised that surgery is not warranted.  I discussed treatment in the form of continued home exercise program, topical  anti-inflammatory, and supportive footwear.  She may visit athletic shoe store to be advised in supportive footwear.  Apply topical diclofenac 3 times daily as needed.  May do Epsom salt soaks.  May wear cam boot few days at a time when symptoms are more aggravated.  Continue home exercise program and resume formal therapy when able.  Follow-up if no improvement in 1 month.       Posterior tibial tendon dysfunction (PTTD) of both lower extremities  63-year-old female with acute worsening of chronic right ankle pain from injury on 5/1/2025.  MRI right ankle noted for:  1.  No fracture  2.  Bone contusions within the talus at the sinus tarsi, and at the medial malleolus  3.  Grade 1 ATFL sprain  4.  Possible chronic posterior tibialis split tear  Clinical impression is that she has symptoms from combination of sprain, contusion, and abnormal mechanics contributing to increased posterior tibialis dysfunction/strain.  I advised that surgery is not warranted.  I discussed treatment in the form of continued home exercise program, topical anti-inflammatory, and supportive footwear.  She may visit athletic shoe store to be advised in supportive footwear.  Apply topical diclofenac 3 times daily as needed.  May do Epsom salt soaks.  May wear cam boot few days at a time when symptoms are more aggravated.  Continue home exercise program and resume formal therapy when able.  Follow-up if no improvement in 1 month.           History of Present Illness   Chief Complaint   Patient presents with    Right Ankle - Follow-up, Pain      HPI  Mera Mendez is a 64 y.o. female who presents for follow-up of acute worsening of chronic right ankle pain from injury on 5/1/2025.  She was last seen by me 1 month ago at which point she was referred for MRI of the right ankle.  She denies significant change in symptoms since last visit.  She reports having pain described localized mainly to the medial aspect of the ankle, aching throbbing and  "burning, worse after prolonged activity, associated with swelling, and improved with resting.  She states she is currently spent a lot of time in watching her grandchildren, and being more active exacerbates her pain.  She has been doing home exercises, however has postponed her formal therapy sessions.    History obtained from: patient    Review of Systems   Musculoskeletal:  Positive for arthralgias and joint swelling.   Neurological:  Negative for weakness and numbness.          Objective   Ht 5' 5\" (1.651 m)   Wt 128 kg (281 lb 9.6 oz)   LMP  (LMP Unknown)   BMI 46.86 kg/m²      Physical Exam  Vitals and nursing note reviewed.   Constitutional:       Appearance: Normal appearance. She is well-developed. She is not ill-appearing or diaphoretic.   HENT:      Head: Normocephalic and atraumatic.      Right Ear: External ear normal.      Left Ear: External ear normal.     Eyes:      Conjunctiva/sclera: Conjunctivae normal.     Neck:      Trachea: No tracheal deviation.   Pulmonary:      Effort: Pulmonary effort is normal. No respiratory distress.   Abdominal:      General: There is no distension.     Skin:     General: Skin is warm and dry.      Coloration: Skin is not jaundiced or pale.     Neurological:      Mental Status: She is alert and oriented to person, place, and time.     Psychiatric:         Mood and Affect: Mood normal.         Behavior: Behavior normal.         Thought Content: Thought content normal.         Judgment: Judgment normal.         I have personally reviewed pertinent films in PACS and my interpretation is  .   MRI right ankle noted for edema medial malleoluss, without appreciable discrete fracture.      Administrative Statements   I have spent a total time of 20 minutes in caring for this patient on the day of the visit/encounter including Diagnostic results, Prognosis, Risks and benefits of tx options, Instructions for management, Impressions, Documenting in the medical record, and " Obtaining or reviewing history  .

## 2025-07-08 ENCOUNTER — APPOINTMENT (OUTPATIENT)
Dept: PHYSICAL THERAPY | Facility: CLINIC | Age: 65
End: 2025-07-08
Attending: FAMILY MEDICINE
Payer: MEDICARE

## 2025-07-10 ENCOUNTER — APPOINTMENT (OUTPATIENT)
Dept: PHYSICAL THERAPY | Facility: CLINIC | Age: 65
End: 2025-07-10
Attending: FAMILY MEDICINE
Payer: MEDICARE

## 2025-07-15 ENCOUNTER — APPOINTMENT (OUTPATIENT)
Dept: PHYSICAL THERAPY | Facility: CLINIC | Age: 65
End: 2025-07-15
Attending: FAMILY MEDICINE
Payer: MEDICARE

## 2025-07-17 ENCOUNTER — APPOINTMENT (OUTPATIENT)
Dept: PHYSICAL THERAPY | Facility: CLINIC | Age: 65
End: 2025-07-17
Attending: FAMILY MEDICINE
Payer: MEDICARE

## 2025-07-22 ENCOUNTER — OFFICE VISIT (OUTPATIENT)
Dept: PHYSICAL THERAPY | Facility: CLINIC | Age: 65
End: 2025-07-22
Attending: FAMILY MEDICINE
Payer: MEDICARE

## 2025-07-22 DIAGNOSIS — S93.491D SPRAIN OF ANTERIOR TALOFIBULAR LIGAMENT OF RIGHT ANKLE, SUBSEQUENT ENCOUNTER: ICD-10-CM

## 2025-07-22 DIAGNOSIS — M76.822 POSTERIOR TIBIAL TENDON DYSFUNCTION (PTTD) OF BOTH LOWER EXTREMITIES: ICD-10-CM

## 2025-07-22 DIAGNOSIS — M19.071 ARTHRITIS OF RIGHT ANKLE: Primary | ICD-10-CM

## 2025-07-22 DIAGNOSIS — M67.873 ACHILLES TENDINOSIS OF RIGHT ANKLE: ICD-10-CM

## 2025-07-22 DIAGNOSIS — M76.821 POSTERIOR TIBIAL TENDON DYSFUNCTION (PTTD) OF BOTH LOWER EXTREMITIES: ICD-10-CM

## 2025-07-22 PROCEDURE — 97110 THERAPEUTIC EXERCISES: CPT

## 2025-07-22 NOTE — PROGRESS NOTES
"Daily Note     Today's date: 2025  Patient name: Mera Mendez  : 1960  MRN: [id number]  Referring provider: Hilary Lewis*  Dx:   Encounter Diagnosis     ICD-10-CM    1. Arthritis of right ankle  M19.071       2. Achilles tendinosis of right ankle  M67.873       3. Sprain of anterior talofibular ligament of right ankle, subsequent encounter  S93.560B       4. Posterior tibial tendon dysfunction (PTTD) of both lower extremities  M76.821     M76.822             Start Time: 0815  Stop Time: 0900  Total time in clinic (min): 45 minutes    Subjective: Pt presents to the clinic today in increased discomfort since her last appointment. She received her MRI results and would like to discuss them.       Objective: See treatment diary below      Assessment: Spent majority of session on pt edu, discussing results of MRI and what they mean for therapy going forward. Also spent time discussing HEP equipment that pt brought in to the clinic, purchasing a level up for CAM boot ambulation, educating pt on the difference between strains/sprains, and other pertinent anatomical information. Updated HEP and discussed additions. Adjusted plan to once every two weeks and will assess pt tolerance to HEP and home modifications.     Plan: Progress treatment as tolerated.       Precautions: Hx of R Ankle Surgery, B/L TKA  POC expires Unit limit Auth Expiration date PT/OT/ST + Visit Limit?   25 BOMN NA BOMN                           Visit/Unit Tracking  AUTH Status:  Date 5/28 6/3 6/6 6/10 6/12 6/25 6/27 7/2       NA Used 1 2 3 4 5 6 7 8        Remaining                      Manuals 5/28 6/3 6/6 6/10 6/12 6/25 6/27 7/    STM calf   SG SG         DF mob   SG                                    Neuro Re-Ed             Tandem stance  3x30\"    2x30\"ea       Biodex     3x LOS 3x  LOS 3x  LOS  80% 3x  LOS  46%  77%  70%                                                                      Ther Ex             Pt " "edu SG        MRI results, anatomy of LE musculature, review of HEP equipment, HEP update    Ankle Alphabet HEP 2x through 2x  through          Calf stretch     3x30\" 2x30\" 3x30\" 3x30\"     Toe raise HEP 2x20           Heel Raise HEP 2x20    2x20 2x20 2x20     Inversion/Eversion AROM HEP            4 way ankle bands HEP Yellow  x20ea Red  x20ea Inversion only,  Blue,  X20  5\"         Bridges  3x10 3x10          Inv/ev walks  red  x20 Red  x20  Red  2x15 Red  2x15 Red  2x15 Red  2x15     Calf raise  2x20 2x20  2x20        Seated post tib raise    Rmb  3x10  5\" Rmb  3x10  5\" Rmb  2x20 Rmb  3x15  5\" Rmb  2x20  5\"     GT bias heel raise    x20 5#  3x15 2x15 2x15      Towel scrunches     3x15 2x15 2x15      Ther Activity                                       Gait Training                                       Modalities                                                   "

## 2025-07-24 ENCOUNTER — APPOINTMENT (OUTPATIENT)
Dept: PHYSICAL THERAPY | Facility: CLINIC | Age: 65
End: 2025-07-24
Attending: FAMILY MEDICINE
Payer: MEDICARE

## 2025-07-29 ENCOUNTER — APPOINTMENT (OUTPATIENT)
Dept: PHYSICAL THERAPY | Facility: CLINIC | Age: 65
End: 2025-07-29
Attending: FAMILY MEDICINE
Payer: MEDICARE

## 2025-07-31 ENCOUNTER — APPOINTMENT (OUTPATIENT)
Dept: PHYSICAL THERAPY | Facility: CLINIC | Age: 65
End: 2025-07-31
Attending: FAMILY MEDICINE
Payer: MEDICARE

## 2025-08-05 ENCOUNTER — OFFICE VISIT (OUTPATIENT)
Dept: PHYSICAL THERAPY | Facility: CLINIC | Age: 65
End: 2025-08-05
Attending: FAMILY MEDICINE
Payer: MEDICARE

## 2025-08-05 DIAGNOSIS — M76.821 POSTERIOR TIBIAL TENDON DYSFUNCTION (PTTD) OF BOTH LOWER EXTREMITIES: ICD-10-CM

## 2025-08-05 DIAGNOSIS — S93.491D SPRAIN OF ANTERIOR TALOFIBULAR LIGAMENT OF RIGHT ANKLE, SUBSEQUENT ENCOUNTER: ICD-10-CM

## 2025-08-05 DIAGNOSIS — M76.822 POSTERIOR TIBIAL TENDON DYSFUNCTION (PTTD) OF BOTH LOWER EXTREMITIES: ICD-10-CM

## 2025-08-05 DIAGNOSIS — M67.873 ACHILLES TENDINOSIS OF RIGHT ANKLE: ICD-10-CM

## 2025-08-05 DIAGNOSIS — M19.071 ARTHRITIS OF RIGHT ANKLE: Primary | ICD-10-CM

## 2025-08-05 PROCEDURE — 97110 THERAPEUTIC EXERCISES: CPT

## 2025-08-06 ENCOUNTER — NURSE TRIAGE (OUTPATIENT)
Age: 65
End: 2025-08-06

## 2025-08-06 DIAGNOSIS — G50.0 TRIGEMINAL NEURALGIA: Primary | ICD-10-CM

## 2025-08-06 RX ORDER — OXCARBAZEPINE 300 MG/1
300 TABLET, FILM COATED ORAL
Qty: 30 TABLET | Refills: 4 | Status: SHIPPED | OUTPATIENT
Start: 2025-08-06 | End: 2025-08-11 | Stop reason: SDUPTHER